# Patient Record
Sex: FEMALE | Race: WHITE | NOT HISPANIC OR LATINO | Employment: FULL TIME | URBAN - METROPOLITAN AREA
[De-identification: names, ages, dates, MRNs, and addresses within clinical notes are randomized per-mention and may not be internally consistent; named-entity substitution may affect disease eponyms.]

---

## 2018-01-17 NOTE — PROGRESS NOTES
Assessment    1  Acute sinus infection (461 9) (J01 90)    Discussion/Summary    #1  Sinusitis-patient given prescription for Zithromax 500 mg one daily #3, Mucinex D  one twice a day and Delsym 2 teaspoons twice a day for her cough  Followup if not better  Possible side effects of new medications were reviewed with the patient/guardian today  The treatment plan was reviewed with the patient/guardian  The patient/guardian understands and agrees with the treatment plan      Chief Complaint  cold x 1 wk - recently became worse  productive cough, green nasal discharge, HA, sinus pressure  OTC meds offer some relief, but not resolving  - [facility]      History of Present Illness  HPI: This is a 24-year-old female who comes in with symptoms of head congestion which started as a cold approximately a week ago  In the past several days she has been bringing up sputum and blowing green exudates at of her nose  Her throat is sore and kept her awake last night  She does admit to having postnasal drip  She has used DayQuil and NyQuil with no relief  Last night she took a Mucinex D  and did not sleep the whole night  She denies any nausea, vomiting or diarrhea  Her blood pressure is 104/76 and her temperature is 98 6  She is allergic to sulfa  Review of Systems    Constitutional: feeling poorly, but as noted in HPI, no fever, no chills and not feeling tired  ENT: Head congestion and postnasal drip, but as noted in HPI, no nosebleeds, no hearing loss and no hoarseness    The patient presents with complaints of gradual onset of mild left earache, described as dull and tender, non-radiating  The patient presents with complaints of gradual onset of moderate bilateral sore throat, described as sharp, non-radiating  The patient presents with complaints of gradual onset of frequent episodes of moderate bilateral nasal discharge     Cardiovascular: no complaints of slow or fast heart rate, no chest pain, no palpitations, no leg claudication or lower extremity edema  Respiratory: as noted in HPI, no shortness of breath, no orthopnea, no wheezing, no shortness of breath during exertion and no PND    The patient presents with complaints of gradual onset of intermittent episodes of moderate cough, described as loose and productive  Gastrointestinal: no complaints of abdominal pain, no constipation, no nausea or diarrhea, no vomiting, no bloody stools  Genitourinary: no complaints of dysuria, no incontinence, no pelvic pain, no dysmenorrhea, no vaginal discharge or abnormal vaginal bleeding  Active Problems    1  Allergic rhinitis (477 9) (J30 9)   2  Dyslipidemia (272 4) (E78 5)   3  Eustachian tube dysfunction (381 81) (H69 80)   4  Fever (780 60) (R50 9)   5  Flu vaccine need (V04 81) (Z23)   6  General medical examination (V70 9) (Z00 00)   7  Generalized anxiety disorder (300 02) (F41 1)   8  Myalgia (729 1) (M79 1)   9  Otitis media (382 9) (H66 90)   10  Request Consultation By Genetic Counselor (X68 33)    Past Medical History    1  History of acute sinusitis (V12 69) (Z87 09)   2  History of pregnancy (V13 29)    Family History    1  Family history of Coronary Artery Disease (V17 49)   2  Family history of Diabetes Mellitus (V18 0)    3  Family history of Thyroid Disorder (V18 19)    Social History    · Being A Social Drinker   · Denied: History of Drug Use   · Marital History - Currently    · Never A Smoker   · Occupation:    · [employer]    Surgical History    1  History of Dilation And Curettage   2  History of Oral Surgery Tooth Extraction   3  History of Oral Surgery Tooth Extraction   4  Request Consultation By Genetic Counselor (S97 33)    Current Meds   1  Budesonide 32 MCG/ACT Nasal Suspension; USE 1 SPRAY IN EACH NOSTRIL ONCE   DAILY; Therapy: 96ALO2825 to (Last GI:82XYY3000)  Requested for: 30EWG2889 Ordered   2   Escitalopram Oxalate 10 MG Oral Tablet; TAKE 1 TABLET BY MOUTH EVERY DAY; Therapy: 55WIH4526 to (Vance Wen)  Requested for: 92Ruy4225; Last   Rx:[id number] Ordered    Allergies    1  Bactrim TABS   2  Sulfa Drugs    Vitals   Recorded: 88MVR9355 10:37AM   Temperature 98 6 F, Oral   Heart Rate 88, L Radial   Pulse Quality Regular   Systolic 353, LUE, Sitting   Diastolic 76, LUE, Sitting   Height 5 ft 5 in   Weight 193 lb 2 08 oz   BMI Calculated 32 14   BSA Calculated 1 95     Physical Exam    Constitutional   General appearance: No acute distress, well appearing and well nourished  Ears, Nose, Mouth, and Throat   External inspection of ears and nose: Normal     Otoscopic examination: Abnormal   Left tympanic membrane is dull and erythematous  Oropharynx: Abnormal   Positive postnasal drip no ear edema of posterior pharynx and no exudates  Pulmonary   Auscultation of lungs: Clear to auscultation  Cardiovascular   Auscultation of heart: Normal rate and rhythm, normal S1 and S2, without murmurs  Examination of extremities for edema and/or varicosities: Normal     Lymphatic   Palpation of lymph nodes in neck: No lymphadenopathy  Results/Data  [place] 15ZEF0001 10:41AM User, Siris     Test Name Result Flag Reference   SBIRT Screen - Tobacco Screening Result Negative       PHQ-2 Adult Depression Screening 54WMN7967 10:41AM User, Ahs     Test Name Result Flag Reference   PHQ-2 Adult Depression Score 0     Q1: 0, Q2: 0   PHQ-2 Adult Depression Screening Negative         Signatures   Electronically signed by : Musa Coffman, [facility]; Arsenio 15 2016 10:55AM EST                       (Author)    Electronically signed by :  DIXON Valdez ; Arsenio 15 2016  2:15PM EST

## 2018-03-16 DIAGNOSIS — F41.9 ANXIETY: Primary | ICD-10-CM

## 2018-03-16 RX ORDER — ESCITALOPRAM OXALATE 10 MG/1
TABLET ORAL
Qty: 30 TABLET | Refills: 2 | Status: SHIPPED | OUTPATIENT
Start: 2018-03-16 | End: 2018-03-19 | Stop reason: SDUPTHER

## 2018-03-19 ENCOUNTER — OFFICE VISIT (OUTPATIENT)
Dept: FAMILY MEDICINE CLINIC | Facility: CLINIC | Age: 33
End: 2018-03-19
Payer: COMMERCIAL

## 2018-03-19 VITALS
HEIGHT: 65 IN | SYSTOLIC BLOOD PRESSURE: 120 MMHG | BODY MASS INDEX: 34.55 KG/M2 | DIASTOLIC BLOOD PRESSURE: 60 MMHG | WEIGHT: 207.4 LBS | HEART RATE: 72 BPM

## 2018-03-19 DIAGNOSIS — F41.9 ANXIETY: ICD-10-CM

## 2018-03-19 DIAGNOSIS — J30.1 ALLERGIC RHINITIS DUE TO POLLEN, UNSPECIFIED CHRONICITY, UNSPECIFIED SEASONALITY: ICD-10-CM

## 2018-03-19 DIAGNOSIS — E78.5 DYSLIPIDEMIA: Primary | ICD-10-CM

## 2018-03-19 PROCEDURE — 99214 OFFICE O/P EST MOD 30 MIN: CPT | Performed by: PHYSICIAN ASSISTANT

## 2018-03-19 PROCEDURE — 3008F BODY MASS INDEX DOCD: CPT | Performed by: PHYSICIAN ASSISTANT

## 2018-03-19 PROCEDURE — 1036F TOBACCO NON-USER: CPT | Performed by: PHYSICIAN ASSISTANT

## 2018-03-19 RX ORDER — ESCITALOPRAM OXALATE 10 MG/1
10 TABLET ORAL DAILY
Qty: 30 TABLET | Refills: 5 | Status: SHIPPED | OUTPATIENT
Start: 2018-03-19 | End: 2018-10-13 | Stop reason: SDUPTHER

## 2018-03-19 NOTE — PROGRESS NOTES
Assessment/Plan:  Patient Instructions   1  Anxiety-patient has been stable on Lexapro 10 mg for 6-7 years and will continue long-term therapy  She has previously weaned with worsening symptoms  2   Dyslipidemia-recommend assessing blood work panel  3   Family history of hypothyroidism-will assess TSH level  4   Allergic rhinitis-stable currently, no medication changes  No problem-specific Assessment & Plan notes found for this encounter  Diagnoses and all orders for this visit:    Dyslipidemia  -     CBC and differential  -     Comprehensive metabolic panel  -     TSH, 3rd generation with T4 reflex  -     Lipid Panel with Direct LDL reflex    Anxiety  -     escitalopram (LEXAPRO) 10 mg tablet; Take 1 tablet (10 mg total) by mouth daily for 30 days  -     CBC and differential  -     Comprehensive metabolic panel  -     TSH, 3rd generation with T4 reflex  -     Lipid Panel with Direct LDL reflex    Allergic rhinitis due to pollen, unspecified chronicity, unspecified seasonality  -     CBC and differential  -     Comprehensive metabolic panel  -     TSH, 3rd generation with T4 reflex  -     Lipid Panel with Direct LDL reflex          Subjective:      Patient ID: Juan Daniel Suero is a 27 y o  female  Chief complaint:  Pt is here for anxiety follow up and med renewal ~cd    HPI:  This is a 64-year-old female who presents to the office for follow-up of anxiety  She continues to use Lexapro 10 mg daily and has been stable on the medication for the past 6-7 years  There was a period of time where she tried to wean off the medication during pregnancy but did notice that things were worse without it  She is happy to continue it long-term at this point and feels that it is still working as well as it was several years ago  She is not interested in increasing the dose  She denies any side effects of the medication  She also mentions that there is significant family history of hypothyroidism    There is no known heart disease but she does have a personal history of dyslipidemia  It has been quite sometime since she has had routine blood test completed  The following portions of the patient's history were reviewed and updated as appropriate: allergies, current medications, past family history, past medical history, past social history, past surgical history and problem list     Review of Systems   Constitutional: Negative for chills, fatigue and fever  HENT: Negative for congestion, ear pain and sinus pressure  Eyes: Negative for visual disturbance  Respiratory: Negative for cough, chest tightness and shortness of breath  Cardiovascular: Negative for chest pain and palpitations  Gastrointestinal: Negative for diarrhea, nausea and vomiting  Endocrine: Negative for polyuria  Genitourinary: Negative for dysuria and frequency  Musculoskeletal: Negative for arthralgias and myalgias  Skin: Negative for pallor and rash  Neurological: Negative for dizziness, weakness, light-headedness, numbness and headaches  Psychiatric/Behavioral: Negative for agitation, behavioral problems and sleep disturbance  All other systems reviewed and are negative  Objective:  Vitals:    03/19/18 1629   BP: 120/60   BP Location: Left arm   Patient Position: Sitting   Cuff Size: Large   Pulse: 72   Weight: 94 1 kg (207 lb 6 4 oz)   Height: 5' 5" (1 651 m)     /60 (BP Location: Left arm, Patient Position: Sitting, Cuff Size: Large)   Pulse 72   Ht 5' 5" (1 651 m)   Wt 94 1 kg (207 lb 6 4 oz)   LMP  (LMP Unknown)   BMI 34 51 kg/m²          Physical Exam   Constitutional: She is oriented to person, place, and time  She appears well-developed and well-nourished  No distress  HENT:   Head: Normocephalic and atraumatic  Right Ear: External ear normal    Left Ear: External ear normal    Nose: Nose normal    Mouth/Throat: Oropharynx is clear and moist  No oropharyngeal exudate     Eyes: Conjunctivae and EOM are normal  Pupils are equal, round, and reactive to light  Neck: Normal range of motion  Neck supple  No tracheal deviation present  No thyromegaly present  Cardiovascular: Normal rate, regular rhythm and normal heart sounds  Exam reveals no friction rub  No murmur heard  Pulmonary/Chest: Effort normal and breath sounds normal  No respiratory distress  She has no wheezes  She has no rales  Abdominal: Soft  Bowel sounds are normal  She exhibits no distension  There is no tenderness  There is no rebound and no guarding  Musculoskeletal: Normal range of motion  She exhibits no edema or tenderness  Lymphadenopathy:     She has no cervical adenopathy  Neurological: She is alert and oriented to person, place, and time  No cranial nerve deficit  Coordination normal    Skin: Skin is warm and dry  No rash noted  No erythema  Psychiatric: She has a normal mood and affect  Her behavior is normal  Thought content normal    Nursing note and vitals reviewed

## 2018-03-19 NOTE — PATIENT INSTRUCTIONS
1   Anxiety-patient has been stable on Lexapro 10 mg for 6-7 years and will continue long-term therapy  She has previously weaned with worsening symptoms  2   Dyslipidemia-recommend assessing blood work panel  3   Family history of hypothyroidism-will assess TSH level  4   Allergic rhinitis-stable currently, no medication changes

## 2018-10-13 DIAGNOSIS — F41.9 ANXIETY: ICD-10-CM

## 2018-10-15 RX ORDER — ESCITALOPRAM OXALATE 10 MG/1
TABLET ORAL
Qty: 30 TABLET | Refills: 5 | Status: SHIPPED | OUTPATIENT
Start: 2018-10-15 | End: 2019-04-07 | Stop reason: SDUPTHER

## 2019-04-07 DIAGNOSIS — F41.9 ANXIETY: ICD-10-CM

## 2019-04-08 RX ORDER — ESCITALOPRAM OXALATE 10 MG/1
10 TABLET ORAL DAILY
Qty: 30 TABLET | Refills: 0 | Status: SHIPPED | OUTPATIENT
Start: 2019-04-08 | End: 2020-04-21 | Stop reason: SDUPTHER

## 2019-04-13 DIAGNOSIS — F41.9 ANXIETY: ICD-10-CM

## 2019-04-15 RX ORDER — ESCITALOPRAM OXALATE 10 MG/1
TABLET ORAL
Qty: 30 TABLET | Refills: 5 | Status: SHIPPED | OUTPATIENT
Start: 2019-04-15 | End: 2019-11-08 | Stop reason: SDUPTHER

## 2019-11-08 DIAGNOSIS — F41.9 ANXIETY: ICD-10-CM

## 2019-11-08 RX ORDER — ESCITALOPRAM OXALATE 10 MG/1
TABLET ORAL
Qty: 30 TABLET | Refills: 5 | Status: SHIPPED | OUTPATIENT
Start: 2019-11-08 | End: 2020-12-16 | Stop reason: SDUPTHER

## 2020-04-21 ENCOUNTER — TELEMEDICINE (OUTPATIENT)
Dept: FAMILY MEDICINE CLINIC | Facility: CLINIC | Age: 35
End: 2020-04-21
Payer: COMMERCIAL

## 2020-04-21 DIAGNOSIS — F41.9 ANXIETY: ICD-10-CM

## 2020-04-21 DIAGNOSIS — Z83.49 FH: HYPOTHYROIDISM: Primary | ICD-10-CM

## 2020-04-21 PROCEDURE — 99213 OFFICE O/P EST LOW 20 MIN: CPT | Performed by: PHYSICIAN ASSISTANT

## 2020-04-21 RX ORDER — ESCITALOPRAM OXALATE 10 MG/1
10 TABLET ORAL DAILY
Qty: 30 TABLET | Refills: 11 | Status: SHIPPED | OUTPATIENT
Start: 2020-04-21 | End: 2020-12-16 | Stop reason: SDUPTHER

## 2020-12-03 LAB
ALBUMIN SERPL-MCNC: 4.4 G/DL (ref 3.8–4.8)
ALBUMIN/GLOB SERPL: 2 {RATIO} (ref 1.2–2.2)
ALP SERPL-CCNC: 81 IU/L (ref 39–117)
ALT SERPL-CCNC: 15 IU/L (ref 0–32)
AST SERPL-CCNC: 15 IU/L (ref 0–40)
BASOPHILS # BLD AUTO: 0 X10E3/UL (ref 0–0.2)
BASOPHILS NFR BLD AUTO: 0 %
BILIRUB SERPL-MCNC: 0.5 MG/DL (ref 0–1.2)
BUN SERPL-MCNC: 12 MG/DL (ref 6–20)
BUN/CREAT SERPL: 15 (ref 9–23)
CALCIUM SERPL-MCNC: 9 MG/DL (ref 8.7–10.2)
CHLORIDE SERPL-SCNC: 104 MMOL/L (ref 96–106)
CHOLEST SERPL-MCNC: 173 MG/DL (ref 100–199)
CO2 SERPL-SCNC: 23 MMOL/L (ref 20–29)
CREAT SERPL-MCNC: 0.79 MG/DL (ref 0.57–1)
EOSINOPHIL # BLD AUTO: 0 X10E3/UL (ref 0–0.4)
EOSINOPHIL NFR BLD AUTO: 1 %
ERYTHROCYTE [DISTWIDTH] IN BLOOD BY AUTOMATED COUNT: 11.8 % (ref 11.7–15.4)
GLOBULIN SER-MCNC: 2.2 G/DL (ref 1.5–4.5)
GLUCOSE SERPL-MCNC: 110 MG/DL (ref 65–99)
HCT VFR BLD AUTO: 39.7 % (ref 34–46.6)
HDLC SERPL-MCNC: 31 MG/DL
HGB BLD-MCNC: 13.1 G/DL (ref 11.1–15.9)
IMM GRANULOCYTES # BLD: 0 X10E3/UL (ref 0–0.1)
IMM GRANULOCYTES NFR BLD: 0 %
LDLC SERPL CALC-MCNC: 129 MG/DL (ref 0–99)
LYMPHOCYTES # BLD AUTO: 1 X10E3/UL (ref 0.7–3.1)
LYMPHOCYTES NFR BLD AUTO: 17 %
MCH RBC QN AUTO: 30.5 PG (ref 26.6–33)
MCHC RBC AUTO-ENTMCNC: 33 G/DL (ref 31.5–35.7)
MCV RBC AUTO: 92 FL (ref 79–97)
MONOCYTES # BLD AUTO: 0.4 X10E3/UL (ref 0.1–0.9)
MONOCYTES NFR BLD AUTO: 6 %
NEUTROPHILS # BLD AUTO: 4.6 X10E3/UL (ref 1.4–7)
NEUTROPHILS NFR BLD AUTO: 76 %
PLATELET # BLD AUTO: 214 X10E3/UL (ref 150–450)
POTASSIUM SERPL-SCNC: 4.1 MMOL/L (ref 3.5–5.2)
PROT SERPL-MCNC: 6.6 G/DL (ref 6–8.5)
RBC # BLD AUTO: 4.3 X10E6/UL (ref 3.77–5.28)
SL AMB EGFR AFRICAN AMERICAN: 111 ML/MIN/1.73
SL AMB EGFR NON AFRICAN AMERICAN: 96 ML/MIN/1.73
SODIUM SERPL-SCNC: 140 MMOL/L (ref 134–144)
TRIGL SERPL-MCNC: 69 MG/DL (ref 0–149)
TSH SERPL DL<=0.005 MIU/L-ACNC: 2.22 UIU/ML (ref 0.45–4.5)
WBC # BLD AUTO: 6 X10E3/UL (ref 3.4–10.8)

## 2020-12-04 ENCOUNTER — TELEPHONE (OUTPATIENT)
Dept: FAMILY MEDICINE CLINIC | Facility: CLINIC | Age: 35
End: 2020-12-04

## 2020-12-04 DIAGNOSIS — E78.5 DYSLIPIDEMIA: Primary | ICD-10-CM

## 2020-12-04 DIAGNOSIS — F32.A DEPRESSION, UNSPECIFIED DEPRESSION TYPE: ICD-10-CM

## 2020-12-04 DIAGNOSIS — F41.1 GENERALIZED ANXIETY DISORDER: ICD-10-CM

## 2020-12-14 LAB
ESTROGEN SERPL-MCNC: 351 PG/ML
FSH SERPL-ACNC: 5.6 MIU/ML
LH SERPL-ACNC: 19 MIU/ML
PROGEST SERPL-MCNC: 1.1 NG/ML
T3 SERPL-MCNC: 102 NG/DL (ref 71–180)
T4 FREE SERPL-MCNC: 1.16 NG/DL (ref 0.82–1.77)

## 2020-12-16 ENCOUNTER — OFFICE VISIT (OUTPATIENT)
Dept: FAMILY MEDICINE CLINIC | Facility: CLINIC | Age: 35
End: 2020-12-16
Payer: COMMERCIAL

## 2020-12-16 ENCOUNTER — TELEPHONE (OUTPATIENT)
Dept: ADMINISTRATIVE | Facility: OTHER | Age: 35
End: 2020-12-16

## 2020-12-16 VITALS
BODY MASS INDEX: 35.51 KG/M2 | WEIGHT: 208 LBS | SYSTOLIC BLOOD PRESSURE: 110 MMHG | DIASTOLIC BLOOD PRESSURE: 64 MMHG | TEMPERATURE: 98.2 F | HEART RATE: 76 BPM | HEIGHT: 64 IN

## 2020-12-16 DIAGNOSIS — F41.9 ANXIETY: ICD-10-CM

## 2020-12-16 DIAGNOSIS — F32.1 MODERATE MAJOR DEPRESSION (HCC): Primary | ICD-10-CM

## 2020-12-16 DIAGNOSIS — R73.01 ELEVATED FASTING GLUCOSE: ICD-10-CM

## 2020-12-16 DIAGNOSIS — E78.5 DYSLIPIDEMIA: ICD-10-CM

## 2020-12-16 PROCEDURE — 3008F BODY MASS INDEX DOCD: CPT | Performed by: PHYSICIAN ASSISTANT

## 2020-12-16 PROCEDURE — 3725F SCREEN DEPRESSION PERFORMED: CPT | Performed by: PHYSICIAN ASSISTANT

## 2020-12-16 PROCEDURE — 1036F TOBACCO NON-USER: CPT | Performed by: PHYSICIAN ASSISTANT

## 2020-12-16 PROCEDURE — 99214 OFFICE O/P EST MOD 30 MIN: CPT | Performed by: PHYSICIAN ASSISTANT

## 2020-12-16 RX ORDER — ESCITALOPRAM OXALATE 20 MG/1
20 TABLET ORAL DAILY
Qty: 30 TABLET | Refills: 5 | Status: SHIPPED | OUTPATIENT
Start: 2020-12-16

## 2020-12-16 RX ORDER — DIPHENOXYLATE HYDROCHLORIDE AND ATROPINE SULFATE 2.5; .025 MG/1; MG/1
1 TABLET ORAL DAILY
COMMUNITY

## 2021-01-25 ENCOUNTER — TELEPHONE (OUTPATIENT)
Dept: FAMILY MEDICINE CLINIC | Facility: CLINIC | Age: 36
End: 2021-01-25

## 2021-01-25 DIAGNOSIS — F41.1 GENERALIZED ANXIETY DISORDER: Primary | ICD-10-CM

## 2021-01-25 RX ORDER — ALPRAZOLAM 0.5 MG/1
0.5 TABLET ORAL
Qty: 20 TABLET | Refills: 0 | Status: SHIPPED | OUTPATIENT
Start: 2021-01-25 | End: 2021-07-22 | Stop reason: ALTCHOICE

## 2021-01-25 NOTE — TELEPHONE ENCOUNTER
I will prescribe a short-term prescription for Xanax in the evening to be used as needed    This should not be long-term as a does have addictive potential

## 2021-01-25 NOTE — TELEPHONE ENCOUNTER
Regarding: Non-Urgent Medical Question  Contact: [phone]  ----- Message from Diogenes Servin sent at 1/25/2021  7:59 AM EST -----       ----- Message from Isidra Neely to Elvia Carmona PA-C sent at 1/24/2021  8:39 PM -----   Hi Dr Deejay Kurtz - It's been about 3 weeks since I upped my Lexapro dosage from 10 mg to 20 mg, and I've been having terrible insomnia - waking up throughout the night with waves of dread and anxiety  Would you be able to prescribe something to help me sleep, like Xanax or something else? Let me know if I should schedule an appt   Thanks so much! - Matheus Toth

## 2021-01-27 ENCOUNTER — TELEPHONE (OUTPATIENT)
Dept: FAMILY MEDICINE CLINIC | Facility: CLINIC | Age: 36
End: 2021-01-27

## 2021-01-27 ENCOUNTER — TELEMEDICINE (OUTPATIENT)
Dept: FAMILY MEDICINE CLINIC | Facility: CLINIC | Age: 36
End: 2021-01-27
Payer: COMMERCIAL

## 2021-01-27 VITALS — WEIGHT: 208 LBS | HEIGHT: 64 IN | BODY MASS INDEX: 35.51 KG/M2

## 2021-01-27 DIAGNOSIS — R68.83 CHILLS: ICD-10-CM

## 2021-01-27 DIAGNOSIS — R09.81 NASAL CONGESTION: ICD-10-CM

## 2021-01-27 DIAGNOSIS — A08.4 VIRAL GASTROENTERITIS: ICD-10-CM

## 2021-01-27 DIAGNOSIS — R11.2 NAUSEA AND VOMITING, INTRACTABILITY OF VOMITING NOT SPECIFIED, UNSPECIFIED VOMITING TYPE: Primary | ICD-10-CM

## 2021-01-27 DIAGNOSIS — R53.83 FATIGUE, UNSPECIFIED TYPE: ICD-10-CM

## 2021-01-27 PROCEDURE — 3008F BODY MASS INDEX DOCD: CPT | Performed by: PHYSICIAN ASSISTANT

## 2021-01-27 PROCEDURE — 99214 OFFICE O/P EST MOD 30 MIN: CPT | Performed by: FAMILY MEDICINE

## 2021-01-27 PROCEDURE — U0005 INFEC AGEN DETEC AMPLI PROBE: HCPCS | Performed by: FAMILY MEDICINE

## 2021-01-27 PROCEDURE — U0003 INFECTIOUS AGENT DETECTION BY NUCLEIC ACID (DNA OR RNA); SEVERE ACUTE RESPIRATORY SYNDROME CORONAVIRUS 2 (SARS-COV-2) (CORONAVIRUS DISEASE [COVID-19]), AMPLIFIED PROBE TECHNIQUE, MAKING USE OF HIGH THROUGHPUT TECHNOLOGIES AS DESCRIBED BY CMS-2020-01-R: HCPCS | Performed by: FAMILY MEDICINE

## 2021-01-27 RX ORDER — PROMETHAZINE HYDROCHLORIDE 25 MG/1
25 TABLET ORAL EVERY 6 HOURS PRN
Qty: 30 TABLET | Refills: 0 | Status: SHIPPED | OUTPATIENT
Start: 2021-01-27 | End: 2021-07-22 | Stop reason: ALTCHOICE

## 2021-01-27 NOTE — TELEPHONE ENCOUNTER
PATIENT JUST CALLED HAD VIRTUAL VISIT THIS MORNING WITH DR JEAN BAPTISTE STATED SHE WENT TO [facility] [address] TO PICKUP MEDICATION & THEY DO NOT HAVE IT, PT STATES IT IS THE PHENERGAN   PLEASE ADVISE       DO NOT SEE MEDICATION BEING SENT

## 2021-01-27 NOTE — PATIENT INSTRUCTIONS
Complete COVID test remain quarantine at 100 rules known  24 hours use clear liquid diet, 1/2 strength Gatorade would be most appropriate  Advance as tolerated with diet  Call if no improvement 24-48 hours and not fully resolved in 1 week    If worsen call office

## 2021-01-27 NOTE — PROGRESS NOTES
COVID-19 Virtual Visit     Assessment/Plan:  1  Nausea vomiting  2  Chills  3  Fatigue  4  Nasal congestion  5  Viral gastroenteritis  Rule out COVID, patient is in for COVID test   Patient may quarantine at home result is known  Clear liquid diet start with 1/2 strength Gatorade advance as tolerated  Call in 24-48 hours if no improvement in 1 week if still symptoms  If worsen call office immediately  Phenergan ordered  Drowsiness discussed      Problem List Items Addressed This Visit     None      Visit Diagnoses     Nausea and vomiting, intractability of vomiting not specified, unspecified vomiting type    -  Primary    Chills        Relevant Orders    Novel Coronavirus (Covid-19),PCR [facility] - Collected at Mobile [facility] or [facility]    Fatigue, unspecified type        Nasal congestion        Viral gastroenteritis             Disposition:     I referred patient to one of our centralized sites for a COVID-19 swab  Patient is in for COVID testing  Remain quarantine home to result is known  Patient use clear liquid diet for 24 hours advance as tolerated  Phenergan was ordered drowsiness discussed  I have spent 18 minutes directly with the patient  Encounter provider Melany Jeans, DO    Provider located at [address] [place] [id number]    Recent Visits  Date Type Provider Dept   01/25/21 Telephone Sapna Marshall, [address] [facility]   Showing recent visits within past 7 days and meeting all other requirements     Today's Visits  Date Type Provider Dept   01/27/21 Telemedicine Melany Jeans, DO [facility]   Showing today's visits and meeting all other requirements     Future Appointments  No visits were found meeting these conditions     Showing future appointments within next 150 days and meeting all other requirements      This virtual check-in was done via [employer] and patient was informed that this is not a secure, HIPAA-compliant platform  She agrees to proceed  Patient agrees to participate in a virtual check in via telephone or video visit instead of presenting to the office to address urgent/immediate medical needs  Patient is aware this is a billable service  After connecting through [facility], the patient was identified by name and date of birth  Tad Mojica was informed that this was a telemedicine visit and that the exam was being conducted confidentially over secure lines  My office door was closed  No one else was in the room  Tad Mojica acknowledged consent and understanding of privacy and security of the telemedicine visit  I informed the patient that I have reviewed her record in Epic and presented the opportunity for her to ask any questions regarding the visit today  The patient agreed to participate  Subjective: Tad Mojica is a 38 y o  female who is concerned about COVID-19  Patient's symptoms include chills, fatigue, nasal congestion, rhinorrhea, nausea and vomiting  Patient denies fever, malaise, sore throat, anosmia, loss of taste, cough, shortness of breath, chest tightness, abdominal pain, diarrhea, myalgias and headaches       Date of symptom onset: 1/24/2021    Exposure:   Contact with a person who is under investigation (PUI) for or who is positive for COVID-19 within the last 14 days?: No    Hospitalized recently for fever and/or lower respiratory symptoms?: No      Currently a healthcare worker that is involved in direct patient care?: No      Works in a special setting where the risk of COVID-19 transmission may be high? (this may include long-term care, correctional and care home facilities; homeless shelters; assisted-living facilities and group homes ): No      Resident in a special setting where the risk of COVID-19 transmission may be high? (this may include long-term care, correctional and care home facilities; homeless shelters; assisted-living facilities and group homes ): No      Sunday, 01/24/2021, patient started with nausea vomiting mild head congestion does have chills denies any fever  Positive fatigue  No abdominal pain  No results found for: [address] [facility] 98, [address] [facility], [address] [facility], [facility] 116  History reviewed  No pertinent past medical history  Past Surgical History:   Procedure Laterality Date    DILATION AND CURETTAGE OF UTERUS      post miscarriage    TOOTH EXTRACTION       Current Outpatient Medications   Medication Sig Dispense Refill    ALPRAZolam (XANAX) 0 5 mg tablet Take 1 tablet (0 5 mg total) by mouth daily at bedtime as needed for anxiety 20 tablet 0    escitalopram (LEXAPRO) 20 mg tablet Take 1 tablet (20 mg total) by mouth daily 30 tablet 5    multivitamin (THERAGRAN) TABS Take 1 tablet by mouth daily       No current facility-administered medications for this visit  Allergies   Allergen Reactions    Sulfa Antibiotics Nausea Only, Vomiting and GI Intolerance       Review of Systems   Constitutional: Positive for chills and fatigue  Negative for fever  HENT: Positive for congestion and rhinorrhea  Negative for sore throat  Eyes: Negative  Respiratory: Negative for cough, chest tightness and shortness of breath  Gastrointestinal: Positive for nausea and vomiting  Negative for abdominal pain and diarrhea  Endocrine: Negative  Genitourinary: Negative  Musculoskeletal: Negative for myalgias  Skin: Negative  Allergic/Immunologic: Negative  Neurological: Negative for headaches  Hematological: Negative  Psychiatric/Behavioral: Negative  Objective:    Vitals:    01/27/21 1100   Weight: 94 3 kg (208 lb)   Height: 5' 4" (1 626 m)       Physical Exam  Vitals signs and nursing note reviewed  Constitutional:       General: She is not in acute distress  Appearance: Normal appearance  She is not ill-appearing, toxic-appearing or diaphoretic  HENT:      Head: Normocephalic and atraumatic  Mouth/Throat:      Mouth: Mucous membranes are moist    Eyes:      General: No scleral icterus  Right eye: No discharge  Left eye: No discharge  Conjunctiva/sclera: Conjunctivae normal    Neck:      Musculoskeletal: No neck rigidity  Pulmonary:      Effort: Pulmonary effort is normal    Skin:     General: Skin is dry  Neurological:      Mental Status: She is alert and oriented to person, place, and time  Cranial Nerves: No cranial nerve deficit  Psychiatric:         Mood and Affect: Mood normal          Behavior: Behavior normal          Thought Content: Thought content normal          Judgment: Judgment normal        VIRTUAL VISIT DISCLAIMER    Gaston Henry acknowledges that she has consented to an online visit or consultation  She understands that the online visit is based solely on information provided by her, and that, in the absence of a face-to-face physical evaluation by the physician, the diagnosis she receives is both limited and provisional in terms of accuracy and completeness  This is not intended to replace a full medical face-to-face evaluation by the physician  Gaston Henry understands and accepts these terms

## 2021-01-28 LAB — SARS-COV-2 RNA RESP QL NAA+PROBE: NEGATIVE

## 2021-05-14 ENCOUNTER — PATIENT MESSAGE (OUTPATIENT)
Dept: FAMILY MEDICINE CLINIC | Facility: CLINIC | Age: 36
End: 2021-05-14

## 2021-05-18 ENCOUNTER — TELEPHONE (OUTPATIENT)
Dept: FAMILY MEDICINE CLINIC | Facility: CLINIC | Age: 36
End: 2021-05-18

## 2021-05-18 ENCOUNTER — PATIENT MESSAGE (OUTPATIENT)
Dept: FAMILY MEDICINE CLINIC | Facility: CLINIC | Age: 36
End: 2021-05-18

## 2021-05-18 NOTE — TELEPHONE ENCOUNTER
Yes, please notify patient that I do have active orders in the computer for her that were placed on the 16th of December and should be completed before her visit

## 2021-05-18 NOTE — TELEPHONE ENCOUNTER
----- Message from Steffany Demetrio sent at 5/14/2021  3:45 PM EDT -----  Regarding: FW: Non-Urgent Medical Question  Contact: [phone]    ----- Message -----  From: Cassie Flanagan  Sent: 5/14/2021   2:53 PM EDT  To: [facility]  Subject: Non-Urgent Medical Question                      Hi Dr Drake Can - I have a follow-up visit scheduled with you for June 16, and I think we talked about repeating all the bloodwork I did in Dec/Jan  Should I get that bloodwork done BEFORE the appt? Thanks!

## 2021-06-09 LAB
ALBUMIN SERPL-MCNC: 4.1 G/DL (ref 3.8–4.8)
ALBUMIN/GLOB SERPL: 1.9 {RATIO} (ref 1.2–2.2)
ALP SERPL-CCNC: 77 IU/L (ref 48–121)
ALT SERPL-CCNC: 13 IU/L (ref 0–32)
AST SERPL-CCNC: 18 IU/L (ref 0–40)
BASOPHILS # BLD AUTO: 0 X10E3/UL (ref 0–0.2)
BASOPHILS NFR BLD AUTO: 0 %
BILIRUB SERPL-MCNC: 0.3 MG/DL (ref 0–1.2)
BUN SERPL-MCNC: 14 MG/DL (ref 6–20)
BUN/CREAT SERPL: 17 (ref 9–23)
CALCIUM SERPL-MCNC: 8.9 MG/DL (ref 8.7–10.2)
CHLORIDE SERPL-SCNC: 101 MMOL/L (ref 96–106)
CHOLEST SERPL-MCNC: 183 MG/DL (ref 100–199)
CO2 SERPL-SCNC: 25 MMOL/L (ref 20–29)
CREAT SERPL-MCNC: 0.81 MG/DL (ref 0.57–1)
EOSINOPHIL # BLD AUTO: 0 X10E3/UL (ref 0–0.4)
EOSINOPHIL NFR BLD AUTO: 1 %
ERYTHROCYTE [DISTWIDTH] IN BLOOD BY AUTOMATED COUNT: 12.2 % (ref 11.7–15.4)
EST. AVERAGE GLUCOSE BLD GHB EST-MCNC: 111 MG/DL
GLOBULIN SER-MCNC: 2.2 G/DL (ref 1.5–4.5)
GLUCOSE SERPL-MCNC: 90 MG/DL (ref 65–99)
HBA1C MFR BLD: 5.5 % (ref 4.8–5.6)
HCT VFR BLD AUTO: 38.3 % (ref 34–46.6)
HDLC SERPL-MCNC: 32 MG/DL
HGB BLD-MCNC: 13.1 G/DL (ref 11.1–15.9)
IMM GRANULOCYTES # BLD: 0 X10E3/UL (ref 0–0.1)
IMM GRANULOCYTES NFR BLD: 0 %
LDLC SERPL CALC-MCNC: 136 MG/DL (ref 0–99)
LDLC/HDLC SERPL: 4.3 RATIO (ref 0–3.2)
LYMPHOCYTES # BLD AUTO: 1.4 X10E3/UL (ref 0.7–3.1)
LYMPHOCYTES NFR BLD AUTO: 29 %
MCH RBC QN AUTO: 30.5 PG (ref 26.6–33)
MCHC RBC AUTO-ENTMCNC: 34.2 G/DL (ref 31.5–35.7)
MCV RBC AUTO: 89 FL (ref 79–97)
MONOCYTES # BLD AUTO: 0.4 X10E3/UL (ref 0.1–0.9)
MONOCYTES NFR BLD AUTO: 9 %
NEUTROPHILS # BLD AUTO: 3 X10E3/UL (ref 1.4–7)
NEUTROPHILS NFR BLD AUTO: 61 %
PLATELET # BLD AUTO: 207 X10E3/UL (ref 150–450)
POTASSIUM SERPL-SCNC: 4.4 MMOL/L (ref 3.5–5.2)
PROT SERPL-MCNC: 6.3 G/DL (ref 6–8.5)
RBC # BLD AUTO: 4.29 X10E6/UL (ref 3.77–5.28)
SL AMB EGFR AFRICAN AMERICAN: 107 ML/MIN/1.73
SL AMB EGFR NON AFRICAN AMERICAN: 92 ML/MIN/1.73
SL AMB VLDL CHOLESTEROL CALC: 15 MG/DL (ref 5–40)
SODIUM SERPL-SCNC: 137 MMOL/L (ref 134–144)
TRIGL SERPL-MCNC: 79 MG/DL (ref 0–149)
TSH SERPL DL<=0.005 MIU/L-ACNC: 1.78 UIU/ML (ref 0.45–4.5)
WBC # BLD AUTO: 4.9 X10E3/UL (ref 3.4–10.8)

## 2021-07-22 ENCOUNTER — OFFICE VISIT (OUTPATIENT)
Dept: FAMILY MEDICINE CLINIC | Facility: CLINIC | Age: 36
End: 2021-07-22
Payer: COMMERCIAL

## 2021-07-22 VITALS
HEART RATE: 92 BPM | HEIGHT: 64 IN | WEIGHT: 201 LBS | BODY MASS INDEX: 34.31 KG/M2 | TEMPERATURE: 98.1 F | SYSTOLIC BLOOD PRESSURE: 118 MMHG | DIASTOLIC BLOOD PRESSURE: 72 MMHG

## 2021-07-22 DIAGNOSIS — R73.01 ELEVATED FASTING GLUCOSE: ICD-10-CM

## 2021-07-22 DIAGNOSIS — R00.2 PALPITATIONS: ICD-10-CM

## 2021-07-22 DIAGNOSIS — E78.5 DYSLIPIDEMIA: ICD-10-CM

## 2021-07-22 DIAGNOSIS — F41.1 GENERALIZED ANXIETY DISORDER: Primary | ICD-10-CM

## 2021-07-22 PROCEDURE — 3008F BODY MASS INDEX DOCD: CPT | Performed by: PHYSICIAN ASSISTANT

## 2021-07-22 PROCEDURE — 1036F TOBACCO NON-USER: CPT | Performed by: PHYSICIAN ASSISTANT

## 2021-07-22 PROCEDURE — 99214 OFFICE O/P EST MOD 30 MIN: CPT | Performed by: PHYSICIAN ASSISTANT

## 2021-07-22 RX ORDER — MELATONIN 10 MG
TABLET, SUBLINGUAL SUBLINGUAL
COMMUNITY
Start: 2021-01-01

## 2021-07-22 RX ORDER — ACETAMINOPHEN AND CODEINE PHOSPHATE 120; 12 MG/5ML; MG/5ML
0.35 SOLUTION ORAL DAILY
COMMUNITY
Start: 2021-03-09 | End: 2022-03-09

## 2021-07-22 RX ORDER — CYANOCOBALAMIN/FOLIC ACID 1MG-400MCG
TABLET, SUBLINGUAL SUBLINGUAL
COMMUNITY

## 2021-07-22 NOTE — PATIENT INSTRUCTIONS
Assessment/plan:  1  Anxiety with depression -patient is having some exacerbation of symptoms  She does continue to follow with the [facility] for medication management  She is questioning if there is some vitamin deficiency or hormonal imbalance that may be contributing to her symptoms and we will order a full panel of labs  Follow up as needed  2  Dyslipidemia -discussed continued diet and exercise for this  She may also add fish oil over-the-counter  She does not have fish regularly in the diet  3  Elevated fasting glucose-this has improved  It has come down from 110 to 90  Her hemoglobin A1c is 5 5  Continue present diet and exercise

## 2021-07-22 NOTE — PROGRESS NOTES
Assessment and Plan:  Patient Instructions     Assessment/plan:  1  Anxiety with depression -patient is having some exacerbation of symptoms  She does continue to follow with the [facility] for medication management  She is questioning if there is some vitamin deficiency or hormonal imbalance that may be contributing to her symptoms and we will order a full panel of labs  Follow up as needed  2  Dyslipidemia -discussed continued diet and exercise for this  She may also add fish oil over-the-counter  She does not have fish regularly in the diet  3  Elevated fasting glucose-this has improved  It has come down from 110 to 90  Her hemoglobin A1c is 5 5  Continue present diet and exercise        Problem List Items Addressed This Visit        Other    Dyslipidemia    Relevant Orders    Vitamin D 25 hydroxy    Magnesium    Basic metabolic panel    Folate    Celiac Disease Antibody Profile    Estrogens, total    Progesterone    Follicle stimulating hormone    FSH and LH    Luteinizing hormone    TSH, 3rd generation with Free T4 reflex    Generalized anxiety disorder - Primary    Relevant Orders    Vitamin D 25 hydroxy    Magnesium    Basic metabolic panel    Folate    Celiac Disease Antibody Profile    Estrogens, total    Progesterone    Follicle stimulating hormone    FSH and LH    Luteinizing hormone    TSH, 3rd generation with Free T4 reflex      Other Visit Diagnoses     Elevated fasting glucose        Relevant Orders    Vitamin D 25 hydroxy    Magnesium    Basic metabolic panel    Folate    Celiac Disease Antibody Profile    Estrogens, total    Progesterone    Follicle stimulating hormone    FSH and LH    Luteinizing hormone    TSH, 3rd generation with Free T4 reflex    Palpitations        Relevant Orders    Vitamin D 25 hydroxy    Magnesium    Basic metabolic panel    Folate    Celiac Disease Antibody Profile    Estrogens, total    Progesterone    Follicle stimulating hormone    FSH and LH Luteinizing hormone    TSH, 3rd generation with Free T4 reflex    Holter monitor - 24 hour                 Diagnoses and all orders for this visit:    Generalized anxiety disorder  -     Vitamin D 25 hydroxy  -     Magnesium; Future  -     Basic metabolic panel; Future  -     Folate; Future  -     Celiac Disease Antibody Profile; Future  -     Estrogens, total; Future  -     Progesterone; Future  -     Follicle stimulating hormone; Future  -     FSH and LH; Future  -     Luteinizing hormone; Future  -     TSH, 3rd generation with Free T4 reflex  -     Magnesium  -     Basic metabolic panel  -     Folate  -     Celiac Disease Antibody Profile  -     Estrogens, total  -     Progesterone  -     Follicle stimulating hormone  -     FSH and LH  -     Luteinizing hormone    Dyslipidemia  -     Vitamin D 25 hydroxy  -     Magnesium; Future  -     Basic metabolic panel; Future  -     Folate; Future  -     Celiac Disease Antibody Profile; Future  -     Estrogens, total; Future  -     Progesterone; Future  -     Follicle stimulating hormone; Future  -     FSH and LH; Future  -     Luteinizing hormone; Future  -     TSH, 3rd generation with Free T4 reflex  -     Magnesium  -     Basic metabolic panel  -     Folate  -     Celiac Disease Antibody Profile  -     Estrogens, total  -     Progesterone  -     Follicle stimulating hormone  -     FSH and LH  -     Luteinizing hormone    Elevated fasting glucose  -     Vitamin D 25 hydroxy  -     Magnesium; Future  -     Basic metabolic panel; Future  -     Folate; Future  -     Celiac Disease Antibody Profile; Future  -     Estrogens, total; Future  -     Progesterone; Future  -     Follicle stimulating hormone; Future  -     FSH and LH; Future  -     Luteinizing hormone;  Future  -     TSH, 3rd generation with Free T4 reflex  -     Magnesium  -     Basic metabolic panel  -     Folate  -     Celiac Disease Antibody Profile  -     Estrogens, total  -     Progesterone  -     Follicle stimulating hormone  -     FSH and LH  -     Luteinizing hormone    Palpitations  -     Vitamin D 25 hydroxy  -     Magnesium; Future  -     Basic metabolic panel; Future  -     Folate; Future  -     Celiac Disease Antibody Profile; Future  -     Estrogens, total; Future  -     Progesterone; Future  -     Follicle stimulating hormone; Future  -     FSH and LH; Future  -     Luteinizing hormone; Future  -     TSH, 3rd generation with Free T4 reflex  -     Holter monitor - 24 hour; Future  -     Magnesium  -     Basic metabolic panel  -     Folate  -     Celiac Disease Antibody Profile  -     Estrogens, total  -     Progesterone  -     Follicle stimulating hormone  -     FSH and LH  -     Luteinizing hormone    Other orders  -     Cancel: CBC and differential; Future  -     Cancel: Comprehensive metabolic panel; Future  -     Cancel: Hemoglobin A1C; Future  -     Cancel: Lipid Panel with Direct LDL reflex; Future  -     Cancel: TSH, 3rd generation; Future  -     Calcium Carb-Cholecalciferol (Calcium 500 + D3) 500-600 MG-UNIT TABS  -     Cobalamin Combinations (B-12) 1000-400 MCG SUBL  -     norethindrone (MICRONOR) 0 35 MG tablet; Take 0 35 mg by mouth daily              Subjective:      Patient ID: Haven Kong is a 43 y o  female  CC:    Chief Complaint   Patient presents with    Follow-up     Patient here for six months f/u  pt will like to discuss ongoing anxiety and dpression issues  HPI:      HPI:  This is a 41-year-old female who presents to the office for follow-up of anxiety and depression  She has been on Lexapro 20 mg   For the past 7 months  [facility]  She is questioning since she had some history of vitamin-D deficiency and stopped her supplementation if that may be related to worsening of her symptoms in the past 4-5 weeks    She does feel that she has had some increase in anxiety symptoms and occasionally fluttering in the chest   She has not had any chest pains or shortness of breath  She has not had any new stimulants  Symptoms may come several times per day  They are usually brief and short lived  She does continue to follow [facility] for her medication management but is wondering if there is any other abnormality of her blood work that may be attributed for her symptoms  She had hormone testing in December of last year but she states since then she has been having excessive hair growth and some other signs that they may be abnormal       The following portions of the patient's history were reviewed and updated as appropriate: allergies, current medications, past family history, past medical history, past social history, past surgical history and problem list       Review of Systems   Constitutional: Negative for chills and fever  HENT: Negative for ear pain and sore throat  Eyes: Negative for pain and visual disturbance  Respiratory: Negative for cough and shortness of breath  Cardiovascular: Negative for chest pain and palpitations  Gastrointestinal: Negative for abdominal pain and vomiting  Genitourinary: Negative for dysuria and hematuria  Musculoskeletal: Negative for arthralgias and back pain  Skin: Negative for color change and rash  Neurological: Negative for seizures and syncope  Psychiatric/Behavioral:        Anxiety and depression   All other systems reviewed and are negative  Data to review:       Objective:    Vitals:    07/22/21 0925   BP: 118/72   BP Location: Right arm   Patient Position: Sitting   Cuff Size: Adult   Pulse: 92   Temp: 98 1 °F (36 7 °C)   TempSrc: Temporal   Weight: 91 2 kg (201 lb)   Height: 5' 4" (1 626 m)        Physical Exam  Constitutional:       General: She is not in acute distress  Appearance: Normal appearance  HENT:      Head: Normocephalic and atraumatic        Right Ear: Tympanic membrane normal       Left Ear: Tympanic membrane normal  Nose: No congestion or rhinorrhea  Eyes:      Conjunctiva/sclera: Conjunctivae normal       Pupils: Pupils are equal, round, and reactive to light  Neck:      Vascular: No carotid bruit  Cardiovascular:      Rate and Rhythm: Normal rate and regular rhythm  Heart sounds: No murmur heard  Pulmonary:      Effort: Pulmonary effort is normal  No respiratory distress  Breath sounds: Normal breath sounds  Abdominal:      Palpations: Abdomen is soft  Musculoskeletal:         General: Normal range of motion  Cervical back: Normal range of motion and neck supple  No muscular tenderness  Lymphadenopathy:      Cervical: No cervical adenopathy  Skin:     General: Skin is warm  Capillary Refill: Capillary refill takes less than 2 seconds  Neurological:      General: No focal deficit present  Mental Status: She is alert and oriented to person, place, and time  Psychiatric:         Mood and Affect: Mood normal            BMI Counseling: Body mass index is 34 5 kg/m²  The BMI is above normal  Nutrition recommendations include decreasing portion sizes  Exercise recommendations include exercising 3-5 times per week

## 2021-07-29 LAB
25(OH)D3+25(OH)D2 SERPL-MCNC: 46.5 NG/ML (ref 30–100)
BUN SERPL-MCNC: 13 MG/DL (ref 6–20)
BUN/CREAT SERPL: 16 (ref 9–23)
CALCIUM SERPL-MCNC: 9.1 MG/DL (ref 8.7–10.2)
CHLORIDE SERPL-SCNC: 104 MMOL/L (ref 96–106)
CO2 SERPL-SCNC: 24 MMOL/L (ref 20–29)
CREAT SERPL-MCNC: 0.83 MG/DL (ref 0.57–1)
ENDOMYSIUM IGA SER QL: NEGATIVE
ESTROGEN SERPL-MCNC: 144 PG/ML
FOLATE SERPL-MCNC: >20 NG/ML
FSH SERPL-ACNC: 5.2 MIU/ML
GLIADIN PEPTIDE IGA SER-ACNC: 5 UNITS (ref 0–19)
GLIADIN PEPTIDE IGG SER-ACNC: 2 UNITS (ref 0–19)
GLUCOSE SERPL-MCNC: 95 MG/DL (ref 65–99)
IGA SERPL-MCNC: 132 MG/DL (ref 87–352)
LH SERPL-ACNC: 5.9 MIU/ML
MAGNESIUM SERPL-MCNC: 2.1 MG/DL (ref 1.6–2.3)
POTASSIUM SERPL-SCNC: 4.7 MMOL/L (ref 3.5–5.2)
PROGEST SERPL-MCNC: 0.1 NG/ML
SL AMB EGFR AFRICAN AMERICAN: 103 ML/MIN/1.73
SL AMB EGFR NON AFRICAN AMERICAN: 90 ML/MIN/1.73
SODIUM SERPL-SCNC: 140 MMOL/L (ref 134–144)
TSH SERPL DL<=0.005 MIU/L-ACNC: 1.3 UIU/ML (ref 0.45–4.5)
TTG IGA SER-ACNC: <2 U/ML (ref 0–3)
TTG IGG SER-ACNC: 2 U/ML (ref 0–5)

## 2022-07-18 ENCOUNTER — PATIENT MESSAGE (OUTPATIENT)
Dept: FAMILY MEDICINE CLINIC | Facility: CLINIC | Age: 37
End: 2022-07-18

## 2022-09-20 ENCOUNTER — OFFICE VISIT (OUTPATIENT)
Dept: FAMILY MEDICINE CLINIC | Facility: CLINIC | Age: 37
End: 2022-09-20
Payer: COMMERCIAL

## 2022-09-20 VITALS
HEART RATE: 84 BPM | WEIGHT: 225.13 LBS | OXYGEN SATURATION: 97 % | BODY MASS INDEX: 38.44 KG/M2 | SYSTOLIC BLOOD PRESSURE: 122 MMHG | TEMPERATURE: 96.8 F | DIASTOLIC BLOOD PRESSURE: 80 MMHG | HEIGHT: 64 IN

## 2022-09-20 DIAGNOSIS — M54.9 UPPER BACK PAIN: Primary | ICD-10-CM

## 2022-09-20 DIAGNOSIS — R73.01 ELEVATED FASTING GLUCOSE: ICD-10-CM

## 2022-09-20 DIAGNOSIS — Z23 NEED FOR TDAP VACCINATION: ICD-10-CM

## 2022-09-20 DIAGNOSIS — F32.1 MODERATE MAJOR DEPRESSION (HCC): ICD-10-CM

## 2022-09-20 DIAGNOSIS — E78.5 DYSLIPIDEMIA: ICD-10-CM

## 2022-09-20 PROCEDURE — 90715 TDAP VACCINE 7 YRS/> IM: CPT | Performed by: PHYSICIAN ASSISTANT

## 2022-09-20 PROCEDURE — 90471 IMMUNIZATION ADMIN: CPT | Performed by: PHYSICIAN ASSISTANT

## 2022-09-20 PROCEDURE — 3725F SCREEN DEPRESSION PERFORMED: CPT | Performed by: PHYSICIAN ASSISTANT

## 2022-09-20 PROCEDURE — 99214 OFFICE O/P EST MOD 30 MIN: CPT | Performed by: PHYSICIAN ASSISTANT

## 2022-09-20 NOTE — LETTER
September 20, 2022     Patient: Scarlet Felix  YOB: 1985  Date of Visit: 9/20/2022      To Whom it May Concern:    Abdon Lee is under my professional care  Teresa Hurtado was seen in my office on 9/20/2022  Teresa Hurtado  has had ongoing problems with generalized back pain of the upper back and shoulder area  This back pain has not been amenable to over-the-counter treatment or home stretching exercises  Patient is interested in pursuing breast reduction therapy and it is my opinion that this will help to alleviate a large portion of her upper back and shoulder pain  She has a mild amount of scoliosis present in the upper back and this could further be exacerbated by excess weight in the area  Please consider covering breast reduction procedure as medically necessary  If you have any questions or concerns, please don't hesitate to call           Sincerely,          Milvia Lundberg PA-C        CC: No Recipients

## 2022-09-20 NOTE — PROGRESS NOTES
Assessment and Plan:  Patient Instructions     Assessment/plan:   Upper thoracic back and shoulder pain-patient may have a small amount of scoliosis  Further survey could be performed with x-ray study however patient does not have significant limitation in her activities of daily living or sleeping  She is interested in pursuing breast reduction therapy and I believe this will help alleviate a lot of the chronic muscle strain that she has in the area  Would recommend follow-up after surgery to consider further evaluation as necessary  Patient verbalizes understanding and agreement with plan  2   Need for tetanus vaccination-patient will be given booster today  3  Obesity-patient continues with weight loss efforts, enjoys walking regularly  4   History of elevated cholesterol-will assess lipid panel and follow-up as necessary  5   History of elevated fasting glucose -will assess hemoglobin A1c  There is family history of type 1 diabetes          Problem List Items Addressed This Visit        Other    Dyslipidemia    Relevant Orders    CBC and differential    Comprehensive metabolic panel    Lipid Panel with Direct LDL reflex    TSH, 3rd generation with Free T4 reflex    Hemoglobin A1C      Other Visit Diagnoses     Upper back pain    -  Primary    Need for Tdap vaccination        Relevant Orders    TDAP VACCINE GREATER THAN OR EQUAL TO 6YO IM    Elevated fasting glucose        Relevant Orders    CBC and differential    Comprehensive metabolic panel    Lipid Panel with Direct LDL reflex    TSH, 3rd generation with Free T4 reflex    Hemoglobin A1C    Moderate major depression (HCC)        Relevant Orders    TSH, 3rd generation with Free T4 reflex                 Diagnoses and all orders for this visit:    Upper back pain    Need for Tdap vaccination  -     TDAP VACCINE GREATER THAN OR EQUAL TO 6YO IM    Elevated fasting glucose  -     CBC and differential  -     Comprehensive metabolic panel  -     Lipid Panel with Direct LDL reflex  -     TSH, 3rd generation with Free T4 reflex  -     Hemoglobin A1C    Dyslipidemia  -     CBC and differential  -     Comprehensive metabolic panel  -     Lipid Panel with Direct LDL reflex  -     TSH, 3rd generation with Free T4 reflex  -     Hemoglobin A1C    Moderate major depression (HCC)  -     TSH, 3rd generation with Free T4 reflex    Other orders  -     Omega-3 Fatty Acids (FISH OIL ADULT GUMMIES PO); Take by mouth            Subjective:      Patient ID: Cisco Howell is a 42 y o  female  CC:    Chief Complaint   Patient presents with    Back Pain     Upper back as well as right shoulder discomfort x 1-2 years  Pt states she went for a consult to have breast reduction and would also need a letter on the back issue  Pt requests a TDAP pt states she is due   mgb       HPI:      HPI:  This is a 58-year-old female who presents to the office with ongoing pain in the upper back and right shoulder area  Patient states  Pain has been present for about a year to  She is interested in pursuing breast reduction therapy and feels that may help alleviate some of her pain  She was being evaluated for breast reduction by the surgeon and was told that she does have a small amount of scoliosis in the back  Patient does not seem to be having any difficulty sleeping at nighttime  She is still able to perform activities of daily  Living without limitation  The following portions of the patient's history were reviewed and updated as appropriate: allergies, current medications, past family history, past medical history, past social history, past surgical history and problem list       Review of Systems   Constitutional: Negative for chills, fatigue and fever  HENT: Negative for congestion, ear pain and sinus pressure  Eyes: Negative for visual disturbance  Respiratory: Negative for cough, chest tightness and shortness of breath      Cardiovascular: Negative for chest pain and palpitations  Gastrointestinal: Negative for diarrhea, nausea and vomiting  Endocrine: Negative for polyuria  Genitourinary: Negative for dysuria and frequency  Musculoskeletal: Negative for arthralgias and myalgias  Skin: Negative for pallor and rash  Neurological: Negative for dizziness, weakness, light-headedness, numbness and headaches  Psychiatric/Behavioral: Negative for agitation, behavioral problems and sleep disturbance  All other systems reviewed and are negative  Data to review:       Objective:    Vitals:    09/20/22 1047   BP: 122/80   BP Location: Left arm   Patient Position: Sitting   Cuff Size: Large   Pulse: 84   Temp: (!) 96 8 °F (36 °C)   TempSrc: Temporal   SpO2: 97%   Weight: 102 kg (225 lb 2 oz)   Height: 5' 4" (1 626 m)        Physical Exam  Constitutional:       General: She is not in acute distress  Appearance: Normal appearance  HENT:      Head: Normocephalic and atraumatic  Right Ear: Tympanic membrane normal       Left Ear: Tympanic membrane normal       Nose: No congestion or rhinorrhea  Eyes:      Conjunctiva/sclera: Conjunctivae normal       Pupils: Pupils are equal, round, and reactive to light  Neck:      Vascular: No carotid bruit  Cardiovascular:      Rate and Rhythm: Normal rate and regular rhythm  Heart sounds: No murmur heard  Pulmonary:      Effort: Pulmonary effort is normal  No respiratory distress  Breath sounds: Normal breath sounds  Abdominal:      Palpations: Abdomen is soft  Musculoskeletal:      Cervical back: Normal range of motion and neck supple  No muscular tenderness  Comments:   Small amount of scoliosis of the upper thoracic spine and noted  Lymphadenopathy:      Cervical: No cervical adenopathy  Skin:     General: Skin is warm  Capillary Refill: Capillary refill takes less than 2 seconds  Neurological:      General: No focal deficit present        Mental Status: She is alert and oriented to person, place, and time     Psychiatric:         Mood and Affect: Mood normal

## 2022-09-20 NOTE — PATIENT INSTRUCTIONS
Assessment/plan:   Upper thoracic back and shoulder pain-patient may have a small amount of scoliosis  Further survey could be performed with x-ray study however patient does not have significant limitation in her activities of daily living or sleeping  She is interested in pursuing breast reduction therapy and I believe this will help alleviate a lot of the chronic muscle strain that she has in the area  Would recommend follow-up after surgery to consider further evaluation as necessary  Patient verbalizes understanding and agreement with plan  2   Need for tetanus vaccination-patient will be given booster today  3  Obesity-patient continues with weight loss efforts, enjoys walking regularly  4   History of elevated cholesterol-will assess lipid panel and follow-up as necessary  5   History of elevated fasting glucose -will assess hemoglobin A1c  There is family history of type 1 diabetes

## 2023-08-15 ENCOUNTER — TELEPHONE (OUTPATIENT)
Dept: PLASTIC SURGERY | Facility: CLINIC | Age: 38
End: 2023-08-15

## 2023-08-15 NOTE — TELEPHONE ENCOUNTER
Pt called the office in regards to breast reduction consult. Pt has one note from 9/022 (checking to see if note is still applicable). PT will need 2 more notes from PCP and 3 months of PT or Chiro services. I reviewed all criteria with pt and emailed her a copy of the criteria as well. I let the pt know if she had any questions she was free to call the office any time during office hours.

## 2023-10-19 ENCOUNTER — OFFICE VISIT (OUTPATIENT)
Dept: FAMILY MEDICINE CLINIC | Facility: CLINIC | Age: 38
End: 2023-10-19

## 2023-10-19 ENCOUNTER — TELEPHONE (OUTPATIENT)
Dept: ADMINISTRATIVE | Facility: OTHER | Age: 38
End: 2023-10-19

## 2023-10-19 VITALS
BODY MASS INDEX: 40.63 KG/M2 | HEIGHT: 64 IN | DIASTOLIC BLOOD PRESSURE: 80 MMHG | TEMPERATURE: 97.5 F | WEIGHT: 238 LBS | OXYGEN SATURATION: 98 % | HEART RATE: 84 BPM | SYSTOLIC BLOOD PRESSURE: 110 MMHG

## 2023-10-19 DIAGNOSIS — M25.512 CHRONIC PAIN OF BOTH SHOULDERS: ICD-10-CM

## 2023-10-19 DIAGNOSIS — M54.9 UPPER BACK PAIN: Primary | ICD-10-CM

## 2023-10-19 DIAGNOSIS — M25.511 CHRONIC PAIN OF BOTH SHOULDERS: ICD-10-CM

## 2023-10-19 DIAGNOSIS — G89.29 CHRONIC PAIN OF BOTH SHOULDERS: ICD-10-CM

## 2023-10-19 NOTE — PROGRESS NOTES
Name: Dianna Wallace      : 1985      MRN: [id number]  Encounter Provider: Jacob García PA-C  Encounter Date: 10/19/2023   Encounter department: [facility]    Assessment & Plan     Patient Instructions   Assessment/plan:  1. Chronic upper back and shoulder pain-patient was seen in 2022 for same problem. She has had some progressive limitation over the past year. She does have more pain especially with physical activity such as running. She enjoys doing Lucy class twice per week but has increased pain in the shoulder area afterward. She finds that the bra straps have been digging into her shoulders and believes a lot of her pain is related to breast size. I would recommend trial of physical therapy for her symptoms. If she is still not having relief consider further evaluation with plastic surgery for breast reduction. Patient verbalizes understanding and agreement with plan. 1. Upper back pain  -     Ambulatory Referral to Physical Therapy; Future    2. Chronic pain of both shoulders  -     Ambulatory Referral to Physical Therapy; Future        Depression Screening and Follow-up Plan: Patient was screened for depression during today's encounter. They screened negative with a PHQ-2 score of 0. Subjective      HPI: This is a 47-year-old female who presents to the office with chronic pain in the upper shoulders and back area that has been for more than 1 year. She was seen in the office in 2022 and was bothered by the discomfort but did not have any significant limitation. She feels over the past year she has been more limited in her physical activities. Things like running are bothersome. She also enjoys doing Lucy class II times per week but has discomfort in the shoulder and back area afterward. Pain by the end of the day seems worse when she has been active.   She does believe that breast size has a lot to do with this as of the bra straps tend to pull on the shoulder and neck area. She is interested in pursuing breast reduction surgery if necessary but is willing to try physical therapy first.    Back Pain  This is a chronic problem. The current episode started more than 1 year ago. The problem occurs constantly. The problem has been waxing and waning since onset. The pain is present in the thoracic spine. The quality of the pain is described as aching. The pain does not radiate. The pain is at a severity of 7/10. The pain is Worse during the day. The symptoms are aggravated by position, sitting, standing and stress. Stiffness is present All day. Associated symptoms include headaches. Pertinent negatives include no abdominal pain, bladder incontinence, bowel incontinence, chest pain, dysuria, fever, leg pain, numbness, paresis, paresthesias, pelvic pain, perianal numbness, tingling, weakness or weight loss. Review of Systems   Constitutional:  Negative for fever and weight loss. Cardiovascular:  Negative for chest pain. Gastrointestinal:  Negative for abdominal pain and bowel incontinence. Genitourinary:  Negative for bladder incontinence, dysuria and pelvic pain. Musculoskeletal:  Positive for back pain and myalgias. Negative for arthralgias. Neurological:  Positive for headaches. Negative for tingling, weakness, numbness and paresthesias.        Current Outpatient Medications on File Prior to Visit   Medication Sig   • escitalopram (LEXAPRO) 20 mg tablet Take 1 tablet (20 mg total) by mouth daily   • multivitamin (THERAGRAN) TABS Take 1 tablet by mouth daily   • [DISCONTINUED] Calcium Carb-Cholecalciferol (Calcium 500 + D3) 500-600 MG-UNIT TABS  (Patient not taking: Reported on 10/19/2023)   • [DISCONTINUED] Cobalamin Combinations (B-12) 1000-400 MCG SUBL  (Patient not taking: Reported on 9/20/2022)   • [DISCONTINUED] norethindrone (MICRONOR) 0.35 MG tablet Take 0.35 mg by mouth daily   • [DISCONTINUED] Omega-3 Fatty Acids (FISH OIL ADULT GUMMIES PO) Take by mouth (Patient not taking: Reported on 10/19/2023)       Objective     /80 (BP Location: Left arm, Patient Position: Sitting, Cuff Size: Large)   Pulse 84   Temp 97.5 °F (36.4 °C) (Tympanic)   Ht 5' 4" (1.626 m)   Wt 108 kg (238 lb)   SpO2 98%   BMI 40.85 kg/m²     Physical Exam  Vitals and nursing note reviewed. Constitutional:       General: She is not in acute distress. Appearance: She is well-developed. HENT:      Head: Normocephalic and atraumatic. Right Ear: External ear normal.      Left Ear: External ear normal.      Nose: Nose normal.      Mouth/Throat:      Pharynx: No oropharyngeal exudate. Eyes:      Conjunctiva/sclera: Conjunctivae normal.      Pupils: Pupils are equal, round, and reactive to light. Neck:      Thyroid: No thyromegaly. Trachea: No tracheal deviation. Cardiovascular:      Rate and Rhythm: Normal rate and regular rhythm. Heart sounds: Normal heart sounds. No murmur heard. No friction rub. Pulmonary:      Effort: Pulmonary effort is normal. No respiratory distress. Breath sounds: Normal breath sounds. No wheezing or rales. Abdominal:      General: Bowel sounds are normal. There is no distension. Palpations: Abdomen is soft. Tenderness: There is no abdominal tenderness. There is no guarding or rebound. Musculoskeletal:         General: Tenderness present. Normal range of motion. Cervical back: Normal range of motion and neck supple. Lymphadenopathy:      Cervical: No cervical adenopathy. Skin:     General: Skin is warm and dry. Findings: No erythema or rash. Neurological:      Mental Status: She is alert and oriented to person, place, and time. Cranial Nerves: No cranial nerve deficit. Coordination: Coordination normal.   Psychiatric:         Behavior: Behavior normal.         Thought Content:  Thought content normal.       Shelbie Shahid PA-C

## 2023-10-19 NOTE — TELEPHONE ENCOUNTER
Upon review of the In Basket request we were able to locate, review, and update the patient chart as requested for Mammogram and Pap Smear (HPV) aka Cervical Cancer Screening. Any additional questions or concerns should be emailed to the Practice Liaisons via the appropriate education email address, please do not reply via In Basket.     Thank you  Ayana Lemos MA

## 2023-10-19 NOTE — PATIENT INSTRUCTIONS
Assessment/plan:  1. Chronic upper back and shoulder pain-patient was seen in September 2022 for same problem. She has had some progressive limitation over the past year. She does have more pain especially with physical activity such as running. She enjoys doing Lucy class twice per week but has increased pain in the shoulder area afterward. She finds that the bra straps have been digging into her shoulders and believes a lot of her pain is related to breast size. I would recommend trial of physical therapy for her symptoms. If she is still not having relief consider further evaluation with plastic surgery for breast reduction. Patient verbalizes understanding and agreement with plan.

## 2023-10-19 NOTE — TELEPHONE ENCOUNTER
----- Message from Noe Mauricio sent at 10/19/2023 10:01 AM EDT -----  Regarding: Alec Lax Request  10/19/23 10:01 AM    Hello, our patient Maria E Barrios has had Mammogram and Pap Smear (HPV) aka Cervical Cancer Screening completed/performed. Please assist in updating the patient chart by pulling the Care Everywhere (CE) document. The date of service is Pap 3/09/21 and mammogram 8/29/23.      Thank you,  Noe [facility]

## 2023-10-31 ENCOUNTER — EVALUATION (OUTPATIENT)
Dept: PHYSICAL THERAPY | Facility: CLINIC | Age: 38
End: 2023-10-31
Payer: COMMERCIAL

## 2023-10-31 DIAGNOSIS — M25.512 CHRONIC PAIN OF BOTH SHOULDERS: ICD-10-CM

## 2023-10-31 DIAGNOSIS — G89.29 CHRONIC PAIN OF BOTH SHOULDERS: ICD-10-CM

## 2023-10-31 DIAGNOSIS — M54.9 UPPER BACK PAIN: ICD-10-CM

## 2023-10-31 DIAGNOSIS — M25.511 CHRONIC PAIN OF BOTH SHOULDERS: ICD-10-CM

## 2023-10-31 PROCEDURE — 97161 PT EVAL LOW COMPLEX 20 MIN: CPT | Performed by: PHYSICAL THERAPIST

## 2023-10-31 NOTE — PROGRESS NOTES
PT Evaluation     Today's date: 10/31/2023  Patient name: Sangita Lake  : 1985  MRN: [id number]  Referring provider: Kayley Lynch PA-C  Dx:   Encounter Diagnosis     ICD-10-CM    1. Upper back pain  M54.9 Ambulatory Referral to Physical Therapy      2. Chronic pain of both shoulders  M25.511 Ambulatory Referral to Physical Therapy    G89.29     M25.512                      Assessment  Assessment details: Patient is a 34 y.o. female who  presents with upper thoracic and shoulder pain associated with periscapular weakness. Her large breasts likely are contributing to her pains. She has functional limitations and a decreased quality of life as a result of impairments. Patient may benefit from course of skilled physical therapy to address above listed impairments in an effort to improve function. Considering breast reduction should conservative means fail to relieve symptoms. Understanding of Dx/Px/POC: good   Prognosis: fair    Goals  Short Term Goals:  1) Pain : Decrease pain to 2/10 at worst x 1 continuous week within 2-3 weeks. 2) Strength: Improved UE strength by at least 1/2 grade for all noted as weak within 2-3 weeks. 3) Function: Improved FOTO score from IE within 2-3 weeks (62@ IE) . LongTerm Goals:  1) Pain : Eliminate ain  x 1 continuous week within 4-6 weeks. 2) Strength: Improved UE strength to 5/5 within 6 weeks. 3) Function: Improved FOTO score to at least  73; no reported difficulty with ADLs within 4-6 weeks. 4) (I) with HEP within 4-6 weeks.     Plan  Patient would benefit from: skilled physical therapy  Planned modality interventions: thermotherapy: hydrocollator packs, TENS and cryotherapy (all prn)  Planned therapy interventions: stretching, strengthening, home exercise program, therapeutic activities, therapeutic exercise, neuromuscular re-education and postural training  Duration in visits: 3  Plan of Care beginning date: 10/31/2023  Plan of Care expiration date: 2023  Treatment plan discussed with: patient      Subjective Evaluation    History of Present Illness  Mechanism of injury: Patient is a 34 y.o. old female who presents for an initial outpatient physical therapy consultation regarding her upper back and shoulder pain. .      Several year history of pain. Not associated with trauma. She has large breasts. Pressure on bra strap on shoulder specifically aggravating. Recent consultatin with PCP, referred for course of physical therapy, she is also considering breast reduction      Has pain most of the time at a level of 4/10 described as an ache. If she does not wear a bra, pain is not as bad. Consulted with her PCP regarding pain and was referred for course of physical therapy. 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 Right   Tenderness of the lower trapezius and upper trapezius.      Active Range of Motion   Cervical/Thoracic Spine       Thoracic    Flexion:  Restriction level: moderate  Extension:  Restriction level: moderate  Left lateral flexion:  Restriction level: minimal  Right lateral flexion:  Restriction level: minimal  Left Shoulder   Normal active range of motion    Right Shoulder   Normal active range of motion    Passive Range of Motion   Left Shoulder   Normal passive range of motion    Right Shoulder   Normal passive range of motion    Joint Play     Hypomobile: T1, T2 and T3     Pain: T1 and T2     Strength/Myotome Testing     Left Shoulder     Isolated Muscles   Lower trapezius: 3+   Rhomboids: 4-   Serratus anterior: 5     Right Shoulder     Isolated Muscles   Lower trapezius: 4-   Rhomboids: 4-   Serratus anterior: 5         (R)  C5- shoulder abduction :5  C6- Elbow flexion Wrist extension :5  C7 - Elbow extension :5  C8 - Finger flexion :5    (L)  C5- shoulder abduction :5  C6- Elbow flexion Wrist extension :5  C7 - Elbow extension :5  C8 - Finger flexion :5         Precautions: none      Manuals 10/31/23                                                                Neuro Re-Ed                                                                                                        Ther Ex             UBE NV            Scap 4 with Tband NV                                                                                          Ther Activity                                       Gait Training                                       Modalities                          IE/HEP RG

## 2023-11-14 ENCOUNTER — APPOINTMENT (OUTPATIENT)
Dept: PHYSICAL THERAPY | Facility: CLINIC | Age: 38
End: 2023-11-14
Payer: COMMERCIAL

## 2023-11-21 ENCOUNTER — OFFICE VISIT (OUTPATIENT)
Dept: PHYSICAL THERAPY | Facility: CLINIC | Age: 38
End: 2023-11-21
Payer: COMMERCIAL

## 2023-11-21 DIAGNOSIS — M25.512 CHRONIC PAIN OF BOTH SHOULDERS: ICD-10-CM

## 2023-11-21 DIAGNOSIS — M54.9 UPPER BACK PAIN: Primary | ICD-10-CM

## 2023-11-21 DIAGNOSIS — M25.511 CHRONIC PAIN OF BOTH SHOULDERS: ICD-10-CM

## 2023-11-21 DIAGNOSIS — G89.29 CHRONIC PAIN OF BOTH SHOULDERS: ICD-10-CM

## 2023-11-21 PROCEDURE — 97110 THERAPEUTIC EXERCISES: CPT | Performed by: PHYSICAL THERAPIST

## 2023-11-21 NOTE — PROGRESS NOTES
Daily Note     Today's date: 2023  Patient name: Shannan Gross  : 1985  MRN: [id number]  Referring provider: Figueroa Osman PA-C  Dx:   Encounter Diagnosis     ICD-10-CM    1. Upper back pain  M54.9       2. Chronic pain of both shoulders  M25.511     G89.29     M25.512                      Subjective: Has been doing her home exercises. Feeling about the same as far as shoulder, upper back pain. Objective: See treatment diary below. Progressed periscapular strengthening program.      Assessment: Tolerated treatment well. Limited progress thus far. Patient maybenefit from continued course of skilled physical therapy to address impairments in an effort to improve function. Plan: Continue per plan of care. Continue HEP, follow up in 4-5 weeks.      Precautions: none      Manuals 10/31/23 11/21           Theragun (B) upper traps, rhomboids/middle traps  1900 x 5min                                                  Neuro Re-Ed                                                                                                        Ther Ex             UBE NV 3/3           Scap 4 with Tband NV Green  3sec  2 x 15                                                                                         Ther Activity                                       Gait Training                                       Modalities                          IE/HEP RG

## 2024-03-06 ENCOUNTER — OFFICE VISIT (OUTPATIENT)
Dept: PHYSICAL THERAPY | Facility: CLINIC | Age: 39
End: 2024-03-06
Payer: COMMERCIAL

## 2024-03-06 DIAGNOSIS — M54.9 UPPER BACK PAIN: Primary | ICD-10-CM

## 2024-03-06 DIAGNOSIS — M25.511 CHRONIC PAIN OF BOTH SHOULDERS: ICD-10-CM

## 2024-03-06 DIAGNOSIS — G89.29 CHRONIC PAIN OF BOTH SHOULDERS: ICD-10-CM

## 2024-03-06 DIAGNOSIS — M25.512 CHRONIC PAIN OF BOTH SHOULDERS: ICD-10-CM

## 2024-03-06 PROCEDURE — 97164 PT RE-EVAL EST PLAN CARE: CPT | Performed by: PHYSICAL THERAPIST

## 2024-03-06 NOTE — PROGRESS NOTES
Progress  Note /P.T. Discharge    Today's date: 3/6/2024  Patient name: Daysi Bonilla  : 1985  MRN: [id number]  Referring provider: Matthew Worthy PA-C  Dx:   Encounter Diagnosis     ICD-10-CM    1. Upper back pain  M54.9       2. Chronic pain of both shoulders  M25.511     G89.29     M25.512             Assessment  Assessment details: Patient is a 38 y.o. female  with upper thoracic and shoulder pain associated with periscapular weakness. Her large breasts likely are contributing to her pains.    Physical therapy has failed to relieve pain.      She still has functional limitations and a decreased quality of life as a result of impairments.     Considering breast reduction.     Recommend surgical consultation.    Understanding of Dx/Px/POC: good   Prognosis: fair     Goals  Short Term Goals:  1) Pain : Decrease pain to 2/10 at worst x 1 continuous week within 2-3 weeks.-not myet  2) Strength: Improved UE strength by at least 1/2 grade for all noted as weak within 2-3 weeks.-met  3) Function: Improved FOTO score from IE within 2-3 weeks (62@ IE) .-not met     LongTerm Goals:  1) Pain : Eliminate ain  x 1 continuous week within 4-6 weeks.-not met  2) Strength: Improved UE strength to 5/5 within 6 weeks.-not met  3) Function: Improved FOTO score to at least  73; no reported difficulty with ADLs within 4-6 weeks.-not met  4) (I) with HEP within 4-6 weeks.-met     Plan  Advised patient to continue with home exercise program which I reviewed with them today. Advised patient to contact me with any future questions or concerns regarding exercise. Pt is in agreement with discharge plan.          Subjective Evaluation     History of Present Illness  Mechanism of injury: Patient is a 38 y.o. old female who presents for an initial outpatient physical therapy consultation regarding her upper back and shoulder pain..        Several year history of pain. Not associated with trauma.She has large breasts. Pressure on  bra strap on shoulder specifically aggravating.      Recent consultatin with PCP, referred for course of physical therapy, she is also considering breast reduction        Has pain most of the time at a level of 4/10 described as an ache.  If she does not wear a bra, pain is not as bad.     Consulted with her PCP regarding pain and was referred for course of physical therapy.             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 Has been doing exercises and still feels the same kinds of pains across top of shoulders and upper back. 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       Patient goals for therapy: decreased pain and increased strength  Patient Goals: has made some strength gains but pain persists.       Pain  Current pain ratin                Objective      Palpation   Left   Tenderness of the lower trapezius and upper trapezius.      Right   Tenderness of the lower trapezius and upper trapezius.      Active Range of Motion   Cervical/Thoracic Spine         Thoracic     Flexion:  Restriction level: minimal  Extension:  Restriction level: moderate  Left lateral flexion:  Restriction level: minimal  Right lateral flexion:  Restriction level: minimal    Left Shoulder   Normal active range of motion     Right Shoulder   Normal active range of motion     Passive Range of Motion   Left Shoulder   Normal passive range of motion     Right Shoulder   Normal passive range of motion     Joint Play      Hypomobile: T2-T8          Strength/Myotome Testing      Left Shoulder      Isolated Muscles   Lower trapezius: 4-  Rhomboids: 4  Serratus  anterior: 5      Right Shoulder      Isolated Muscles   Lower trapezius: 4  Rhomboids: 4  Serratus anterior: 5            (R)  C5- shoulder abduction :5  C6- Elbow flexion Wrist extension :5  C7 - Elbow extension :5  C8 - Finger flexion :5     (L)  C5- shoulder abduction :5  C6- Elbow flexion Wrist extension :5  C7 - Elbow extension :4+ (notes recent elbow injury)  C8 - Finger flexion :5            Precautions: none      Manuals 10/31/23 11/21 3/6          Theragun (B) upper traps, rhomboids/middle traps  1900 x 5min                                                  Neuro Re-Ed                                                                                                        Ther Ex             UBE NV 3/3           Scap 4 with Tband NV Green  3sec  2 x 15                        Tband horizontal Abduction   [place]  2 x 10                                                              Ther Activity                                       Gait Training                                       Modalities                          IE/HEP RG  RVL

## 2024-03-15 ENCOUNTER — OFFICE VISIT (OUTPATIENT)
Dept: FAMILY MEDICINE CLINIC | Facility: CLINIC | Age: 39
End: 2024-03-15
Payer: COMMERCIAL

## 2024-03-15 VITALS
DIASTOLIC BLOOD PRESSURE: 78 MMHG | BODY MASS INDEX: 40.92 KG/M2 | SYSTOLIC BLOOD PRESSURE: 118 MMHG | TEMPERATURE: 96.7 F | WEIGHT: 238.38 LBS

## 2024-03-15 DIAGNOSIS — F41.1 GENERALIZED ANXIETY DISORDER: ICD-10-CM

## 2024-03-15 DIAGNOSIS — M25.511 CHRONIC PAIN OF BOTH SHOULDERS: ICD-10-CM

## 2024-03-15 DIAGNOSIS — J30.1 ALLERGIC RHINITIS DUE TO POLLEN, UNSPECIFIED SEASONALITY: ICD-10-CM

## 2024-03-15 DIAGNOSIS — E78.5 DYSLIPIDEMIA: ICD-10-CM

## 2024-03-15 DIAGNOSIS — N62 LARGE BREASTS: Primary | ICD-10-CM

## 2024-03-15 DIAGNOSIS — M25.512 CHRONIC PAIN OF BOTH SHOULDERS: ICD-10-CM

## 2024-03-15 DIAGNOSIS — M54.9 UPPER BACK PAIN: ICD-10-CM

## 2024-03-15 DIAGNOSIS — G89.29 CHRONIC PAIN OF BOTH SHOULDERS: ICD-10-CM

## 2024-03-15 DIAGNOSIS — R73.01 ELEVATED FASTING GLUCOSE: ICD-10-CM

## 2024-03-15 PROCEDURE — 99214 OFFICE O/P EST MOD 30 MIN: CPT | Performed by: PHYSICIAN ASSISTANT

## 2024-03-15 NOTE — PROGRESS NOTES
Name: Daysi Bonilla      : 1985      MRN: [id number]  Encounter Provider: Matthew Worthy PA-C  Encounter Date: 3/15/2024   Encounter department: [facility]    Assessment & Plan     Patient Instructions   Assessment/plan:  Large Breast Size-patient has had chronic upper back and neck pain which is likely related to breast mass.  She has been uncomfortable wearing bra and has to take it off throughout the course of the day to alleviate pain.  She has tried NSAIDs with little relief.  She has also gone through physical therapy treatments with little benefit.  Will consult plastic surgery to discuss reconstructive options.  2.   Chronic upper Back and Neck pain.-Likely secondary to breast mass as above.  Patient has tried all conventional normal therapies such as physical therapy, NSAIDs, removing the bra, but has had persistent symptoms.  3.  Anxiety-presently stable on Lexapro therapy through the [facility].  4.  Dyslipidemia-status unknown.  Recommend reassessing comprehensive labs.    1. Large breasts  -     Ambulatory Referral to Plastic Surgery; Future    2. Chronic pain of both shoulders  -     Ambulatory Referral to Plastic Surgery; Future    3. Generalized anxiety disorder  -     CBC and differential  -     Comprehensive metabolic panel  -     Hemoglobin A1C  -     Lipid Panel with Direct LDL reflex  -     TSH, 3rd generation with Free T4 reflex    4. Dyslipidemia  -     CBC and differential  -     Comprehensive metabolic panel  -     Hemoglobin A1C  -     Lipid Panel with Direct LDL reflex  -     TSH, 3rd generation with Free T4 reflex    5. Allergic rhinitis due to pollen, unspecified seasonality    6. Elevated fasting glucose  -     CBC and differential  -     Comprehensive metabolic panel  -     Hemoglobin A1C  -     Lipid Panel with Direct LDL reflex  -     TSH, 3rd generation with Free T4 reflex    7. Upper back pain  -     Ambulatory Referral to Plastic Surgery;  Future           Subjective      HPI: This is a 38-year-old female that presents to the office with chronic upper back and neck pain that has been persistent for over a year and a half.  She had initial visit in September 2022, again in October 2023, and now today.  She has done physical therapy for this with very minor benefit.  She continues to have a lot of tension in the trapezius area and shoulder muscles.  This is likely contributed to by her large breast size.  Patient is very uncomfortable wearing a bra through the full day and has to take it off regularly.  She also has some flareups at times and uses ibuprofen for the pain.  She is interested in pursuing breast reduction if it would help limit her pain and discomfort.    Back Pain  This is a chronic problem. The current episode started more than 1 year ago. The problem occurs constantly. The problem has been waxing and waning since onset. The pain is present in the thoracic spine. The quality of the pain is described as aching. The pain does not radiate. The pain is at a severity of 5/10. The pain is Worse during the day. The symptoms are aggravated by bending, position, sitting and twisting. Stiffness is present All day. Pertinent negatives include no abdominal pain, bladder incontinence, bowel incontinence, chest pain, dysuria, fever, headaches, leg pain, numbness, paresis, paresthesias, pelvic pain, perianal numbness, tingling, weakness or weight loss. Risk factors include obesity.     Review of Systems   Constitutional:  Negative for fever and weight loss.   Cardiovascular:  Negative for chest pain.   Gastrointestinal:  Negative for abdominal pain and bowel incontinence.   Genitourinary:  Negative for bladder incontinence, dysuria and pelvic pain.   Musculoskeletal:  Positive for back pain.   Neurological:  Negative for tingling, weakness, numbness, headaches and paresthesias.       Current Outpatient Medications on File Prior to Visit   Medication Sig    • escitalopram (LEXAPRO) 20 mg tablet Take 1 tablet (20 mg total) by mouth daily   • multivitamin (THERAGRAN) TABS Take 1 tablet by mouth daily       Objective     /78 (BP Location: Left arm, Patient Position: Sitting, Cuff Size: Large)   Temp (!) 96.7 °F (35.9 °C) (Temporal)   Wt 108 kg (238 lb 6 oz)   BMI 40.92 kg/m²     Physical Exam  Vitals and nursing note reviewed.   Constitutional:       General: She is not in acute distress.     Appearance: She is well-developed.   HENT:      Head: Normocephalic and atraumatic.      Right Ear: External ear normal.      Left Ear: External ear normal.      Nose: Nose normal.      Mouth/Throat:      Pharynx: No oropharyngeal exudate.   Eyes:      Conjunctiva/sclera: Conjunctivae normal.      Pupils: Pupils are equal, round, and reactive to light.   Neck:      Thyroid: No thyromegaly.      Trachea: No tracheal deviation.   Cardiovascular:      Rate and Rhythm: Normal rate and regular rhythm.      Heart sounds: Normal heart sounds. No murmur heard.     No friction rub.   Pulmonary:      Effort: Pulmonary effort is normal. No respiratory distress.      Breath sounds: Normal breath sounds. No wheezing or rales.   Abdominal:      General: Bowel sounds are normal. There is no distension.      Palpations: Abdomen is soft.      Tenderness: There is no abdominal tenderness. There is no guarding or rebound.   Musculoskeletal:         General: Tenderness present. Normal range of motion.      Cervical back: Normal range of motion and neck supple.      Comments: Patient does have some tenderness and tightness to palpation at the bilateral trapezius muscles and supraspinatus.   Lymphadenopathy:      Cervical: No cervical adenopathy.   Skin:     General: Skin is warm and dry.      Findings: No erythema or rash.   Neurological:      Mental Status: She is alert and oriented to person, place, and time.      Cranial Nerves: No cranial nerve deficit.      Coordination: Coordination  normal.   Psychiatric:         Behavior: Behavior normal.         Thought Content: Thought content normal.       Matthew Worthy PA-C

## 2024-03-15 NOTE — PATIENT INSTRUCTIONS
Assessment/plan:  Large Breast Size-patient has had chronic upper back and neck pain which is likely related to breast mass.  She has been uncomfortable wearing bra and has to take it off throughout the course of the day to alleviate pain.  She has tried NSAIDs with little relief.  She has also gone through physical therapy treatments with little benefit.  Will consult plastic surgery to discuss reconstructive options.  2.   Chronic upper Back and Neck pain.-Likely secondary to breast mass as above.  Patient has tried all conventional normal therapies such as physical therapy, NSAIDs, removing the bra, but has had persistent symptoms.  3.  Anxiety-presently stable on [facility]apro therapy through [facility].  4.  Dyslipidemia-status unknown.  Recommend reassessing comprehensive labs.

## 2024-04-10 ENCOUNTER — TELEPHONE (OUTPATIENT)
Dept: PLASTIC SURGERY | Facility: CLINIC | Age: 39
End: 2024-04-10

## 2024-04-10 NOTE — TELEPHONE ENCOUNTER
Emailed current criteria for breast reduction and asked to advise me when complete.  We had been in contact in early 2023 but criteria at that time was not complete.  Need to have 2024 criteria met.

## 2024-04-11 ENCOUNTER — TELEPHONE (OUTPATIENT)
Dept: PLASTIC SURGERY | Facility: CLINIC | Age: 39
End: 2024-04-11

## 2024-04-11 NOTE — TELEPHONE ENCOUNTER
Replied to patient's email and advised we need 6 current months of physical therapy notes documenting her shoulder and back pain.  To date, physical therapy in chart is 10/31/23, 11/21/23 and 3/6/2024.

## 2024-04-18 ENCOUNTER — TELEPHONE (OUTPATIENT)
Dept: FAMILY MEDICINE CLINIC | Facility: CLINIC | Age: 39
End: 2024-04-18

## 2024-04-18 ENCOUNTER — PATIENT MESSAGE (OUTPATIENT)
Dept: FAMILY MEDICINE CLINIC | Facility: CLINIC | Age: 39
End: 2024-04-18

## 2024-04-18 NOTE — TELEPHONE ENCOUNTER
Regarding: Letter for breast reduction  Contact: [phone]  ----- Message from Nannette Nolasco sent at 4/18/2024  2:41 PM EDT -----       ----- Message from Daysi Bonilla to Matthew Worthy PA-C sent at 4/18/2024  2:18 PM -----   Hi Dr. Worthy - Could you please write another letter recommending a breast reduction? You might be able to use most of the language from the letter you did for me a few years ago (I need a new letter for insurance). Thanks so much! - Daysi

## 2024-04-18 NOTE — LETTER
April 18, 2024     Patient: Daysi Bonilla  YOB: 1985        To Whom it May Concern:    Daysi Bonilla is under my professional care. Daysi was seen in my office on 3/15/2024. Daysi  has had ongoing problems with generalized back pain of the upper back and shoulder area.  This back pain has not been amenable to over-the-counter treatment or home stretching exercises.  She has been seen in our office several times regarding this pain and has not responded to conservative treatment measures either.  Patient has been through a course of physical therapy without significant benefit.  Patient is interested in pursuing breast reduction therapy and it is my opinion that this will help to alleviate a large portion of her upper back and shoulder pain.  She has a mild amount of scoliosis present in the upper back and this could further be exacerbated by excess weight in the area.  Please consider covering breast reduction procedure as medically necessary.    If you have any questions or concerns, please don't hesitate to call.         Sincerely,          Matthew Worthy PA-C        CC: No Recipients

## 2024-06-12 ENCOUNTER — ANESTHESIA EVENT (OUTPATIENT)
Dept: PERIOP | Facility: HOSPITAL | Age: 39
End: 2024-06-12
Payer: COMMERCIAL

## 2024-06-12 NOTE — PRE-PROCEDURE INSTRUCTIONS
Pre-Surgery Instructions:   Medication Instructions    escitalopram (LEXAPRO) 20 mg tablet Take night before surgery    multivitamin (THERAGRAN) TABS Instructions provided by MD    Medication instructions for day surgery reviewed. Please use only a sip of water to take your instructed medications. Avoid all over the counter vitamins, supplements and NSAIDS for one week prior to surgery per anesthesia guidelines. Tylenol is ok to take as needed.     You will receive a call one business day prior to surgery with an arrival time and hospital directions. If your surgery is scheduled on a Monday, the hospital will be calling you on the Friday prior to your surgery. If you have not heard from anyone by 8pm, please call the hospital supervisor through the hospital  at [phone]. ([place] [phone] or [place] [phone]).    Do not eat or drink anything after midnight the night before your surgery, including candy, mints, lifesavers, or chewing gum. Do not drink alcohol 24hrs before your surgery. Try not to smoke at least 24hrs before your surgery.       Follow the pre surgery showering instructions as listed in the “My Surgical Experience Booklet” or otherwise provided by your surgeon's office. Do not use a blade to shave the surgical area 1 week before surgery. It is okay to use a clean electric clippers up to 24 hours before surgery. Do not apply any lotions, creams, including makeup, cologne, deodorant, or perfumes after showering on the day of your surgery. Do not use dry shampoo, hair spray, hair gel, or any type of hair products.     No contact lenses, eye make-up, or artificial eyelashes. Remove nail polish, including gel polish, and any artificial, gel, or acrylic nails if possible. Remove all jewelry including rings and body piercing jewelry.     Wear causal clothing that is easy to take on and off. Consider your type of surgery.    Keep any valuables, jewelry, piercings at home. Please bring any  specially ordered equipment (sling, braces) if indicated.    Arrange for a responsible person to drive you to and from the hospital on the day of your surgery. Please confirm the visitor policy for the day of your procedure when you receive your phone call with an arrival time.     Call the surgeon's office with any new illnesses, exposures, or additional questions prior to surgery.    Please reference your “My Surgical Experience Booklet” for additional information to prepare for your upcoming surgery.

## 2024-06-21 ENCOUNTER — ANESTHESIA (OUTPATIENT)
Dept: PERIOP | Facility: HOSPITAL | Age: 39
End: 2024-06-21
Payer: COMMERCIAL

## 2024-06-21 ENCOUNTER — HOSPITAL ENCOUNTER (OUTPATIENT)
Facility: HOSPITAL | Age: 39
Setting detail: OUTPATIENT SURGERY
Discharge: HOME | End: 2024-06-21
Attending: SURGERY | Admitting: SURGERY
Payer: COMMERCIAL

## 2024-06-21 VITALS
HEART RATE: 77 BPM | TEMPERATURE: 97 F | WEIGHT: 231.48 LBS | SYSTOLIC BLOOD PRESSURE: 120 MMHG | OXYGEN SATURATION: 96 % | RESPIRATION RATE: 20 BRPM | BODY MASS INDEX: 39.52 KG/M2 | HEIGHT: 64 IN | DIASTOLIC BLOOD PRESSURE: 66 MMHG

## 2024-06-21 DIAGNOSIS — M54.89 OTHER BACK PAIN, UNSPECIFIED CHRONICITY: ICD-10-CM

## 2024-06-21 DIAGNOSIS — N62 HYPERTROPHY OF BREAST: ICD-10-CM

## 2024-06-21 LAB
EXT PREGNANCY TEST URINE: NEGATIVE
EXT. CONTROL: NORMAL

## 2024-06-21 PROCEDURE — 88305 TISSUE EXAM BY PATHOLOGIST: CPT | Performed by: PATHOLOGY

## 2024-06-21 PROCEDURE — 81025 URINE PREGNANCY TEST: CPT | Performed by: ANESTHESIOLOGY

## 2024-06-21 RX ORDER — KETOROLAC TROMETHAMINE 30 MG/ML
INJECTION, SOLUTION INTRAMUSCULAR; INTRAVENOUS AS NEEDED
Status: DISCONTINUED | OUTPATIENT
Start: 2024-06-21 | End: 2024-06-21

## 2024-06-21 RX ORDER — ROCURONIUM BROMIDE 10 MG/ML
INJECTION, SOLUTION INTRAVENOUS AS NEEDED
Status: DISCONTINUED | OUTPATIENT
Start: 2024-06-21 | End: 2024-06-21

## 2024-06-21 RX ORDER — GLYCOPYRROLATE 0.2 MG/ML
INJECTION INTRAMUSCULAR; INTRAVENOUS AS NEEDED
Status: DISCONTINUED | OUTPATIENT
Start: 2024-06-21 | End: 2024-06-21

## 2024-06-21 RX ORDER — OXYCODONE HYDROCHLORIDE AND ACETAMINOPHEN 5; 325 MG/1; MG/1
1 TABLET ORAL EVERY 4 HOURS PRN
Status: DISCONTINUED | OUTPATIENT
Start: 2024-06-21 | End: 2024-06-21 | Stop reason: HOSPADM

## 2024-06-21 RX ORDER — ONDANSETRON 2 MG/ML
INJECTION INTRAMUSCULAR; INTRAVENOUS AS NEEDED
Status: DISCONTINUED | OUTPATIENT
Start: 2024-06-21 | End: 2024-06-21

## 2024-06-21 RX ORDER — CEFAZOLIN SODIUM 1 G/3ML
INJECTION, POWDER, FOR SOLUTION INTRAMUSCULAR; INTRAVENOUS AS NEEDED
Status: DISCONTINUED | OUTPATIENT
Start: 2024-06-21 | End: 2024-06-21

## 2024-06-21 RX ORDER — PROMETHAZINE HYDROCHLORIDE 25 MG/ML
12.5 INJECTION, SOLUTION INTRAMUSCULAR; INTRAVENOUS EVERY 4 HOURS PRN
Status: DISCONTINUED | OUTPATIENT
Start: 2024-06-21 | End: 2024-06-21 | Stop reason: HOSPADM

## 2024-06-21 RX ORDER — CEFAZOLIN SODIUM 2 G/50ML
2000 SOLUTION INTRAVENOUS ONCE
Status: COMPLETED | OUTPATIENT
Start: 2024-06-21 | End: 2024-06-21

## 2024-06-21 RX ORDER — LIDOCAINE HYDROCHLORIDE 20 MG/ML
INJECTION, SOLUTION EPIDURAL; INFILTRATION; INTRACAUDAL; PERINEURAL AS NEEDED
Status: DISCONTINUED | OUTPATIENT
Start: 2024-06-21 | End: 2024-06-21

## 2024-06-21 RX ORDER — ACETAMINOPHEN 325 MG/1
650 TABLET ORAL EVERY 6 HOURS PRN
Status: DISCONTINUED | OUTPATIENT
Start: 2024-06-21 | End: 2024-06-21 | Stop reason: HOSPADM

## 2024-06-21 RX ORDER — ONDANSETRON 2 MG/ML
4 INJECTION INTRAMUSCULAR; INTRAVENOUS EVERY 6 HOURS PRN
Status: DISCONTINUED | OUTPATIENT
Start: 2024-06-21 | End: 2024-06-21 | Stop reason: HOSPADM

## 2024-06-21 RX ORDER — DEXAMETHASONE SODIUM PHOSPHATE 10 MG/ML
INJECTION, SOLUTION INTRAMUSCULAR; INTRAVENOUS AS NEEDED
Status: DISCONTINUED | OUTPATIENT
Start: 2024-06-21 | End: 2024-06-21

## 2024-06-21 RX ORDER — FENTANYL CITRATE 50 UG/ML
INJECTION, SOLUTION INTRAMUSCULAR; INTRAVENOUS AS NEEDED
Status: DISCONTINUED | OUTPATIENT
Start: 2024-06-21 | End: 2024-06-21

## 2024-06-21 RX ORDER — SODIUM CHLORIDE, SODIUM LACTATE, POTASSIUM CHLORIDE, CALCIUM CHLORIDE 600; 310; 30; 20 MG/100ML; MG/100ML; MG/100ML; MG/100ML
INJECTION, SOLUTION INTRAVENOUS CONTINUOUS PRN
Status: DISCONTINUED | OUTPATIENT
Start: 2024-06-21 | End: 2024-06-21

## 2024-06-21 RX ORDER — SODIUM CHLORIDE 9 MG/ML
125 INJECTION, SOLUTION INTRAVENOUS CONTINUOUS
Status: DISCONTINUED | OUTPATIENT
Start: 2024-06-21 | End: 2024-06-21 | Stop reason: HOSPADM

## 2024-06-21 RX ORDER — PHENYLEPHRINE HCL IN 0.9% NACL 1 MG/10 ML
SYRINGE (ML) INTRAVENOUS AS NEEDED
Status: DISCONTINUED | OUTPATIENT
Start: 2024-06-21 | End: 2024-06-21

## 2024-06-21 RX ORDER — FENTANYL CITRATE 50 UG/ML
50 INJECTION, SOLUTION INTRAMUSCULAR; INTRAVENOUS
Status: DISCONTINUED | OUTPATIENT
Start: 2024-06-21 | End: 2024-06-21 | Stop reason: HOSPADM

## 2024-06-21 RX ORDER — PROPOFOL 10 MG/ML
INJECTION, EMULSION INTRAVENOUS AS NEEDED
Status: DISCONTINUED | OUTPATIENT
Start: 2024-06-21 | End: 2024-06-21

## 2024-06-21 RX ORDER — MIDAZOLAM HYDROCHLORIDE 2 MG/2ML
INJECTION, SOLUTION INTRAMUSCULAR; INTRAVENOUS AS NEEDED
Status: DISCONTINUED | OUTPATIENT
Start: 2024-06-21 | End: 2024-06-21

## 2024-06-21 RX ADMIN — LIDOCAINE HYDROCHLORIDE 100 MG: 20 INJECTION, SOLUTION EPIDURAL; INFILTRATION; INTRACAUDAL at 09:35

## 2024-06-21 RX ADMIN — ROCURONIUM BROMIDE 10 MG: 10 INJECTION, SOLUTION INTRAVENOUS at 11:29

## 2024-06-21 RX ADMIN — KETOROLAC TROMETHAMINE 30 MG: 30 INJECTION, SOLUTION INTRAMUSCULAR; INTRAVENOUS at 14:26

## 2024-06-21 RX ADMIN — TRANEXAMIC ACID: 100 INJECTION, SOLUTION INTRAVENOUS at 12:55

## 2024-06-21 RX ADMIN — Medication 100 MCG: at 12:52

## 2024-06-21 RX ADMIN — SUGAMMADEX 200 MG: 100 INJECTION, SOLUTION INTRAVENOUS at 14:48

## 2024-06-21 RX ADMIN — CEFAZOLIN 2000 MG: 1 INJECTION, POWDER, FOR SOLUTION INTRAMUSCULAR; INTRAVENOUS; PARENTERAL at 13:28

## 2024-06-21 RX ADMIN — ROCURONIUM BROMIDE 50 MG: 10 INJECTION, SOLUTION INTRAVENOUS at 09:36

## 2024-06-21 RX ADMIN — SODIUM CHLORIDE: 0.9 INJECTION, SOLUTION INTRAVENOUS at 10:01

## 2024-06-21 RX ADMIN — CEFAZOLIN SODIUM 2000 MG: 2 SOLUTION INTRAVENOUS at 09:40

## 2024-06-21 RX ADMIN — ROCURONIUM BROMIDE 20 MG: 10 INJECTION, SOLUTION INTRAVENOUS at 10:26

## 2024-06-21 RX ADMIN — SODIUM CHLORIDE, SODIUM LACTATE, POTASSIUM CHLORIDE, AND CALCIUM CHLORIDE: .6; .31; .03; .02 INJECTION, SOLUTION INTRAVENOUS at 12:45

## 2024-06-21 RX ADMIN — FENTANYL CITRATE 25 MCG: 50 INJECTION INTRAMUSCULAR; INTRAVENOUS at 13:02

## 2024-06-21 RX ADMIN — PROMETHAZINE HYDROCHLORIDE 12.5 MG: 25 INJECTION INTRAMUSCULAR; INTRAVENOUS at 15:36

## 2024-06-21 RX ADMIN — ROCURONIUM BROMIDE 20 MG: 10 INJECTION, SOLUTION INTRAVENOUS at 13:21

## 2024-06-21 RX ADMIN — MIDAZOLAM 2 MG: 1 INJECTION INTRAMUSCULAR; INTRAVENOUS at 09:32

## 2024-06-21 RX ADMIN — FENTANYL CITRATE 100 MCG: 50 INJECTION INTRAMUSCULAR; INTRAVENOUS at 09:35

## 2024-06-21 RX ADMIN — FENTANYL CITRATE 50 MCG: 50 INJECTION INTRAMUSCULAR; INTRAVENOUS at 11:50

## 2024-06-21 RX ADMIN — GLYCOPYRROLATE 0.2 MCG: 0.2 INJECTION, SOLUTION INTRAMUSCULAR; INTRAVENOUS at 09:40

## 2024-06-21 RX ADMIN — ONDANSETRON 4 MG: 2 INJECTION INTRAMUSCULAR; INTRAVENOUS at 09:48

## 2024-06-21 RX ADMIN — FENTANYL CITRATE 25 MCG: 50 INJECTION INTRAMUSCULAR; INTRAVENOUS at 12:04

## 2024-06-21 RX ADMIN — Medication 100 MCG: at 14:10

## 2024-06-21 RX ADMIN — SODIUM CHLORIDE 125 ML/HR: 0.9 INJECTION, SOLUTION INTRAVENOUS at 07:50

## 2024-06-21 RX ADMIN — PROPOFOL 200 MG: 10 INJECTION, EMULSION INTRAVENOUS at 09:35

## 2024-06-21 RX ADMIN — DEXAMETHASONE SODIUM PHOSPHATE 10 MG: 10 INJECTION INTRAMUSCULAR; INTRAVENOUS at 09:40

## 2024-06-21 RX ADMIN — ROCURONIUM BROMIDE 20 MG: 10 INJECTION, SOLUTION INTRAVENOUS at 11:46

## 2024-06-21 NOTE — OP NOTE
OPERATIVE REPORT  PATIENT NAME: Daysi Bonilla    :  1985  MRN: [id number]  Pt Location: [facility] OR ROOM 01    SURGERY DATE: 2024    Surgeons and Role:     * Mele Bravo MD - Primary    Preop Diagnosis:  Hypertrophy of breast [N62]  Other back pain, unspecified chronicity [M54.89]    Post-Op Diagnosis Codes:     * Hypertrophy of breast [N62]     * Other back pain, unspecified chronicity [M54.89]    Procedure(s):  Bilateral - b/l breast reduction    Specimen(s):  ID Type Source Tests Collected by Time Destination   1 : left breast tissue Tissue Breast, Left TISSUE EXAM Mele Bravo MD 2024 1303    2 : right breast tissue Tissue Breast, Right TISSUE EXAM Mele Bravo MD 2024 1303        Estimated Blood Loss:   Minimal    Drains:  Closed/Suction Drain Inferior;Lateral;Right Breast Bulb 15 Fr. (Active)   Number of days: 0       Closed/Suction Drain Inferior;Lateral;Left Breast Bulb 15 Fr. (Active)   Number of days: 0       Urethral Catheter Latex 16 Fr. (Active)   Number of days: 0       Anesthesia Type:   General    Operative Indications:  Hypertrophy of breast [N62]  Other back pain, unspecified chronicity [M54.89]  This is a 39 year old female with symptomatic macromastia. She now presents for bilateral breast reduction. Risks, benefits and alternatives were discussed with the patient.    Operative Findings:  Right breast tissue 1042g  Left breast tissue 1212g      Complications:   None    Procedure and Technique:  The patient was met in the preoperative holding area and marked according to hospital policy. A Ga pattern breast reduction was planned and the patient was marked accordingly. The patient was brought to the operating room and positioned carefully on the table, all pressure points were adequately padded. Sequential compression devices (SCD) were placed on the legs for mechanical thromboembolic prophylaxis against DVT. IV antibiotics were  administered. Anesthesia was induced and the surgical site was prepped and draped in the usual sterile fashion. An OR pause was performed to confirm the correct patient, procedure, and fire safety risk. Local anesthesia consisting of 1% lidocaine with epinephrine (1:100,000) with 0.25% marcaine was injected into the planned incisions for post-op pain relief.     The breast reduction was planned with an inferior pedicle Wise pattern technique. The incisions were made with a scalpel. A tourniquet was applied to the base of the breast.  A 42mm cookie cutter was also used to score the nipple areolar complex.  The intervening skin was de-epithelialized sharply with a 10 blade to create a inferiorly-based pedicle of perfusion to the NAC. The tourniquet was released. The pedicle was carefully dissected with bovie to preserve blood supply to tissues including NAC.  The redundant breast skin, fat, and glandular tissue was resected and sent to pathology. The open wound of breast was copiously irrigated and inspected meticulously for hemostasis, which was achieved with Bovie electrocautery. A drain was placed. The Ga pattern inverted-T incision was then tailor tacked closed over the pedicle. The nipple areolar complex was then delivered and inset into the proper position along the breast meridian.  The vertical and horizontal incisions were then closed with 3-0 PDS sutures in a deep dermal fashion. The vertical incision was closed with 3-0 monocryl suture, and the horizontal incision was closed with 3-0 Stratifix suture.  The nipple areolar complex was also closed carefully and circumferentially utilizing 3-0 monocryl and 4-0 monocryl. The breast skin and the nipple-areolar complex appeared pink and viable. An identical procedure was performed on the other side. There was excellent symmetry noted between the two breasts at the end of the operation. The final specimen weights were: right side 1042g and left side 1212g.   Skin  glue was applied to the incisions.    All sponge, needle and instrument counts were correct at the end of the case. The patient tolerated the procedure without complications and was transferred to the recovery room in stable condition. I was the attending plastic surgeon for the entire procedure.    I was present for the entire procedure.    Patient Disposition:  PACU         SIGNATURE: Mele Bravo MD  DATE: June 21, 2024  TIME: 3:15 PM

## 2024-06-21 NOTE — INTERVAL H&P NOTE
H&P reviewed. After examining the patient I find no changes in the patients condition since the H&P had been written.    Vitals:    06/21/24 0750   BP: 124/76   Pulse: 87   Resp: 16   Temp: (!) 97 °F (36.1 °C)   SpO2: 96%     To OR for bilateral breast reduction

## 2024-06-21 NOTE — DISCHARGE INSTR - AVS FIRST PAGE
Keep incisions clean. OK to shower starting tomorrow, do not let the water hit the area directly. Wear the bra at all times after surgery until your follow up appointment, except to take a shower. No heavy lifting, limit weight to 10 lbs. Do not raise your arms more higher than your shoulder height. Empty and record drain output twice daily, keep a log of this output and bring it to your follow up appointment.    Take Tylenol and ibuprofen as needed for pain. If needed, you may take Percocet, just ensure that total Tylenol dose does not exceeed 4,000mg in a day.    Follow up in the office on 6/24/2024.    If you have any questions or concerns, please call the office.

## 2024-06-21 NOTE — ANESTHESIA POSTPROCEDURE EVALUATION
Post-Op Assessment Note    CV Status:  Stable  Pain Score: 0    Pain management: adequate       Mental Status:  Alert and awake   Hydration Status:  Euvolemic   PONV status: zofran given.   Airway Patency:  Patent     Post Op Vitals Reviewed: Yes    No anethesia notable event occurred.    Staff: CRNA               BP   128/80   Temp   97   Pulse  110   Resp   14   SpO2   92% RA

## 2024-06-21 NOTE — ANESTHESIA PREPROCEDURE EVALUATION
Procedure:  b/l breast reduction (Bilateral: Breast)    Relevant Problems   NEURO/PSYCH   (+) Generalized anxiety disorder        Physical Exam    Airway    Mallampati score: II         Dental       Cardiovascular  Rhythm: regular, Rate: normal    Pulmonary   Breath sounds clear to auscultation    Other Findings  post-pubertal.      Anesthesia Plan  ASA Score- 2     Anesthesia Type- general with ASA Monitors.         Additional Monitors:     Airway Plan:            Plan Factors-Exercise tolerance (METS): >4 METS.    Chart reviewed.   Existing labs reviewed. Patient summary reviewed.    Patient is not a current smoker.      Obstructive sleep apnea risk education given perioperatively.        Induction- intravenous.    Postoperative Plan-     Perioperative Resuscitation Plan - Level 1 - Full Code.       Informed Consent- Anesthetic plan and risks discussed with patient.

## 2024-06-26 PROCEDURE — 88305 TISSUE EXAM BY PATHOLOGIST: CPT | Performed by: PATHOLOGY

## 2024-09-03 ENCOUNTER — OFFICE VISIT (OUTPATIENT)
Dept: FAMILY MEDICINE CLINIC | Facility: CLINIC | Age: 39
End: 2024-09-03
Payer: COMMERCIAL

## 2024-09-03 VITALS
TEMPERATURE: 97.3 F | HEART RATE: 68 BPM | BODY MASS INDEX: 38.93 KG/M2 | HEIGHT: 64 IN | WEIGHT: 228 LBS | DIASTOLIC BLOOD PRESSURE: 72 MMHG | SYSTOLIC BLOOD PRESSURE: 116 MMHG | OXYGEN SATURATION: 99 %

## 2024-09-03 DIAGNOSIS — F41.1 GENERALIZED ANXIETY DISORDER: ICD-10-CM

## 2024-09-03 DIAGNOSIS — J01.00 ACUTE NON-RECURRENT MAXILLARY SINUSITIS: Primary | ICD-10-CM

## 2024-09-03 DIAGNOSIS — E66.09 CLASS 2 OBESITY DUE TO EXCESS CALORIES WITHOUT SERIOUS COMORBIDITY WITH BODY MASS INDEX (BMI) OF 39.0 TO 39.9 IN ADULT: ICD-10-CM

## 2024-09-03 PROCEDURE — 99214 OFFICE O/P EST MOD 30 MIN: CPT | Performed by: PHYSICIAN ASSISTANT

## 2024-09-03 PROCEDURE — 3725F SCREEN DEPRESSION PERFORMED: CPT | Performed by: PHYSICIAN ASSISTANT

## 2024-09-03 RX ORDER — CEFUROXIME AXETIL 500 MG/1
500 TABLET ORAL EVERY 12 HOURS SCHEDULED
Qty: 20 TABLET | Refills: 0 | Status: SHIPPED | OUTPATIENT
Start: 2024-09-03 | End: 2024-09-13

## 2024-09-03 NOTE — PATIENT INSTRUCTIONS
Assessment/plan:  1.  Acute sinusitis-patient started on Ceftin 500 mg twice daily for 10 days.  Encourage plenty of fluids and rest.  May continue Mucinex, Advil, Flonase over-the-counter as necessary.  Follow-up in the next 7 to 10 days if not improving.  2.  Generalized anxiety disorder-stable on Lexapro therapy, patient is following with [facility] for medication management.  3.  Obesity-patient is doing better and feeling better after breast reduction surgery this summer.  Continue healthy diet and exercise plan.

## 2024-09-03 NOTE — PROGRESS NOTES
Answers submitted by the patient for this visit:  Sore Throat Questionnaire (Submitted on 9/3/2024)  Chief Complaint: Sore throat  Chronicity: new  Onset: 1 to 4 weeks ago  Progression since onset: waxing and waning  Pain worse on: right  Fever: no fever  pain severity now: moderate  Pain - numeric: 4/10  abdominal pain: No  congestion: Yes  cough: No  diarrhea: No  drooling: No  ear discharge: No  ear pain: Yes  headaches: Yes  hoarse voice: No  neck pain: No  plugged ear sensation: No  stridor: No  shortness of breath: No  swollen glands: No  trouble swallowing: Yes  vomiting: No  Ambulatory Visit  Name: Daysi Bonilla      : 1985      MRN: [id number]  Encounter Provider: Matthew Worthy PA-C  Encounter Date: 9/3/2024   Encounter department: [facility]    Assessment & Plan   1. Acute non-recurrent maxillary sinusitis  -     cefuroxime (CEFTIN) 500 mg tablet; Take 1 tablet (500 mg total) by mouth every 12 (twelve) hours for 10 days  2. Generalized anxiety disorder  3. Class 2 obesity due to excess calories without serious comorbidity with body mass index (BMI) of 39.0 to 39.9 in adult      Depression Screening and Follow-up Plan: Patient was screened for depression during today's encounter. They screened negative with a PHQ-2 score of 0.      History of Present Illness     HPI: This is a 39-year-old female who presents to the office with sinus congestion and pressure for about 3 weeks.  She has had thicker yellow mucus and discharge with it.  She is having some headaches and discomfort.  Symptoms seem to come and go and she is also having some ear pain and pain into the throat more recently.  She has not had relief with Mucinex, Advil, or over-the-counter Flonase thus far.  She also has history of anxiety which has been stable on Lexapro.  She is continuing to follow with the specialist [facility].  She also mentions that she did have breast reduction surgery completed  "this summer and has been feeling better.  She is having less neck and shoulder pain and finds that she has had an easier time breathing since the surgery.    Sore Throat   This is a new problem. The current episode started 1 to 4 weeks ago. The problem has been waxing and waning. The pain is worse on the right side. There has been no fever. The pain is at a severity of 4/10. The pain is moderate. Associated symptoms include congestion, ear pain, headaches and trouble swallowing. Pertinent negatives include no abdominal pain, coughing, diarrhea, drooling, ear discharge, hoarse voice, plugged ear sensation, neck pain, shortness of breath, stridor, swollen glands or vomiting.       Review of Systems   HENT:  Positive for congestion, ear pain, sore throat and trouble swallowing. Negative for drooling, ear discharge and hoarse voice.    Respiratory:  Negative for cough, shortness of breath and stridor.    Gastrointestinal:  Negative for abdominal pain, diarrhea and vomiting.   Musculoskeletal:  Negative for neck pain.   Neurological:  Positive for headaches.       Objective     /72 (BP Location: Left arm, Patient Position: Sitting, Cuff Size: Large)   Pulse 68   Temp (!) 97.3 °F (36.3 °C) (Tympanic)   Ht 5' 4\" (1.626 m)   Wt 103 kg (228 lb)   SpO2 99%   BMI 39.14 kg/m²     Physical Exam  Vitals and nursing note reviewed.   Constitutional:       General: She is not in acute distress.     Appearance: She is well-developed.   HENT:      Head: Normocephalic and atraumatic.      Mouth/Throat:      Pharynx: No oropharyngeal exudate.   Eyes:      General:         Right eye: No discharge.         Left eye: No discharge.      Pupils: Pupils are equal, round, and reactive to light.   Cardiovascular:      Rate and Rhythm: Normal rate and regular rhythm.      Heart sounds: Normal heart sounds. No murmur heard.     No friction rub.   Pulmonary:      Effort: Pulmonary effort is normal. No respiratory distress.      Breath " sounds: Normal breath sounds. No wheezing or rales.   Musculoskeletal:         General: Normal range of motion.      Cervical back: Neck supple.   Lymphadenopathy:      Cervical: Cervical adenopathy present.   Skin:     General: Skin is warm and dry.      Findings: No erythema.   Neurological:      Mental Status: She is alert and oriented to person, place, and time.   Psychiatric:         Behavior: Behavior normal.       Administrative Statements

## 2025-05-02 ENCOUNTER — OFFICE VISIT (OUTPATIENT)
Dept: FAMILY MEDICINE CLINIC | Facility: CLINIC | Age: 40
End: 2025-05-02
Payer: COMMERCIAL

## 2025-05-02 ENCOUNTER — TELEPHONE (OUTPATIENT)
Dept: FAMILY MEDICINE CLINIC | Facility: CLINIC | Age: 40
End: 2025-05-02

## 2025-05-02 VITALS
WEIGHT: 229 LBS | DIASTOLIC BLOOD PRESSURE: 62 MMHG | HEIGHT: 64 IN | OXYGEN SATURATION: 99 % | BODY MASS INDEX: 39.09 KG/M2 | SYSTOLIC BLOOD PRESSURE: 114 MMHG | HEART RATE: 85 BPM

## 2025-05-02 DIAGNOSIS — F41.1 GENERALIZED ANXIETY DISORDER: ICD-10-CM

## 2025-05-02 DIAGNOSIS — E66.01 SEVERE OBESITY (BMI 35.0-39.9) WITH COMORBIDITY (HCC): ICD-10-CM

## 2025-05-02 DIAGNOSIS — F32.0 MILD MAJOR DEPRESSION (HCC): ICD-10-CM

## 2025-05-02 DIAGNOSIS — E66.01 SEVERE OBESITY (BMI 35.0-39.9) WITH COMORBIDITY (HCC): Primary | ICD-10-CM

## 2025-05-02 DIAGNOSIS — E55.9 VITAMIN D DEFICIENCY: ICD-10-CM

## 2025-05-02 DIAGNOSIS — K21.9 GASTROESOPHAGEAL REFLUX DISEASE WITHOUT ESOPHAGITIS: ICD-10-CM

## 2025-05-02 DIAGNOSIS — E78.5 DYSLIPIDEMIA: ICD-10-CM

## 2025-05-02 PROCEDURE — 99214 OFFICE O/P EST MOD 30 MIN: CPT | Performed by: PHYSICIAN ASSISTANT

## 2025-05-02 RX ORDER — SEMAGLUTIDE 0.25 MG/.5ML
0.25 INJECTION, SOLUTION SUBCUTANEOUS WEEKLY
Refills: 0 | OUTPATIENT
Start: 2025-05-02

## 2025-05-02 RX ORDER — SEMAGLUTIDE 0.25 MG/.5ML
INJECTION, SOLUTION SUBCUTANEOUS
Qty: 2 ML | Refills: 0 | Status: SHIPPED | OUTPATIENT
Start: 2025-05-02

## 2025-05-02 NOTE — ASSESSMENT & PLAN NOTE
Orders:  •  CBC and differential  •  Comprehensive metabolic panel  •  Lipid Panel with Direct LDL reflex  •  TSH, 3rd generation with Free T4 reflex  •  Hemoglobin A1C

## 2025-05-02 NOTE — PROGRESS NOTES
Name: Daysi Bonilla      : 1985      MRN: [id number]  Encounter Provider: Matthew Worthy PA-C  Encounter Date: 2025   Encounter department: [facility]  :  Assessment & Plan  Severe obesity (BMI 35.0-39.9) with comorbidity (HCC)  Prior Authorization Clinical Questions for Weight Management Pharmacotherapy    1. Does the patient have a contrainidcation to medication prescribed for weight management?: No  2. Does the patient have a diagnosis of obesity, confirmed by a BMI greater than or equal to 30 kg/m^2?: Yes  3. Does the patient have a BMI of greater than or equal to 27 kg/m^2 with at least one weight-related comorbidity/risk factor/complication (e.g. diabetes, dyslipidemia, coronary artery disease)?: Yes  4. Weight-related co-morbidities/risk factors: dyslipidemia, GERD, depression  5. WEGOVY CVA Indication: Does patient have established documented cardiovascular disease (history of a prior heart attack (myocardial infarction), stroke, or symptomatic peripheral arterial disease (PAD)?: N/A  6. ZEPBOUND GUADALUPE Indication: Does patient have documented GUADALUPE diagnosed via sleep study (insurance will require copy of sleep study results for approval)?: N/A  7. Has the patient been on a weight loss regimen of low-calorie diet, increased physical activity, and lifestyle modifications for a minimum of 6 months?: Yes  8. Has the patient completed a comprehensive weight loss program (ie, Weight Watchers, Noom, Bariatrics, other deep on phone)? If so, what?: No  9. Does the patient have a history of type 2 diabetes?: No  10. Has the member tried and failed other weight loss medication within the past 12 months?: No  11. Will the member use requested medication in combination with another GLP agonist or weight loss drug?: No  12. Is the medication a controlled substance?: No     Baseline weight (in pounds): 229 lbs         Orders:  •  CBC and differential  •  Comprehensive metabolic panel  •   Lipid Panel with Direct LDL reflex  •  TSH, 3rd generation with Free T4 reflex  •  Hemoglobin A1C  •  Semaglutide-Weight Management (Wegovy) 0.25 MG/0.5ML; Inject 0.25 mg under the skin weekly    Gastroesophageal reflux disease without esophagitis    Orders:  •  CBC and differential  •  Comprehensive metabolic panel  •  Lipid Panel with Direct LDL reflex  •  TSH, 3rd generation with Free T4 reflex  •  Hemoglobin A1C    Mild major depression (HCC)      Orders:  •  CBC and differential  •  Comprehensive metabolic panel  •  Lipid Panel with Direct LDL reflex  •  TSH, 3rd generation with Free T4 reflex  •  Hemoglobin A1C    Dyslipidemia    Orders:  •  CBC and differential  •  Comprehensive metabolic panel  •  Lipid Panel with Direct LDL reflex  •  TSH, 3rd generation with Free T4 reflex  •  Hemoglobin A1C    Vitamin D deficiency    Orders:  •  CBC and differential  •  Comprehensive metabolic panel  •  Lipid Panel with Direct LDL reflex  •  TSH, 3rd generation with Free T4 reflex  •  Hemoglobin A1C  •  Vitamin D 25 hydroxy    Generalized anxiety disorder    Orders:  •  CBC and differential  •  Comprehensive metabolic panel  •  Lipid Panel with Direct LDL reflex  •  TSH, 3rd generation with Free T4 reflex  •  Hemoglobin A1C           History of Present Illness   HPI: This is a 39-year-old female who presents to the office for follow-up of chronic health conditions and to discuss weight loss.  She has been frustrated with her inability to lose weight with diet and exercise.  She would like to get some labs and make sure there is no metabolic abnormalities.  She is also interested in discussing weight loss assisting medications.  Patient did have breast reduction surgery last year because of ongoing back pain and shoulder pain related to weight and breast size.  She has had some improvement in symptoms since then.  She also has comorbidities including esophageal reflux disease, depression, and dyslipidemia.  She is  "currently taking Lexapro for anxiety and depression and has been stable with this.  She has been finding herself using Tums more frequently with acid reflux and feels weight loss is necessary to help reduce this.      Review of Systems   Constitutional:  Negative for chills, fatigue and fever.   HENT:  Negative for congestion, ear pain and sinus pressure.    Eyes:  Negative for visual disturbance.   Respiratory:  Negative for cough, chest tightness and shortness of breath.    Cardiovascular:  Negative for chest pain and palpitations.   Gastrointestinal:  Negative for diarrhea, nausea and vomiting.   Endocrine: Negative for polyuria.   Genitourinary:  Negative for dysuria and frequency.   Musculoskeletal:  Negative for arthralgias and myalgias.   Skin:  Negative for pallor and rash.   Neurological:  Negative for dizziness, weakness, light-headedness, numbness and headaches.   Psychiatric/Behavioral:  Negative for agitation, behavioral problems and sleep disturbance.    All other systems reviewed and are negative.      Objective   /62 (BP Location: Right arm, Patient Position: Sitting, Cuff Size: Standard)   Pulse 85   Ht 5' 4\" (1.626 m)   Wt 104 kg (229 lb)   SpO2 99%   BMI 39.31 kg/m²      Physical Exam  Constitutional:       General: She is not in acute distress.     Appearance: Normal appearance.   HENT:      Head: Normocephalic and atraumatic.      Right Ear: Tympanic membrane normal.      Left Ear: Tympanic membrane normal.      Nose: No congestion or rhinorrhea.   Eyes:      Conjunctiva/sclera: Conjunctivae normal.      Pupils: Pupils are equal, round, and reactive to light.   Neck:      Vascular: No carotid bruit.   Cardiovascular:      Rate and Rhythm: Normal rate and regular rhythm.      Heart sounds: No murmur heard.  Pulmonary:      Effort: Pulmonary effort is normal. No respiratory distress.      Breath sounds: Normal breath sounds.   Abdominal:      Palpations: Abdomen is soft. "   Musculoskeletal:         General: Normal range of motion.      Cervical back: Normal range of motion and neck supple. No muscular tenderness.   Lymphadenopathy:      Cervical: No cervical adenopathy.   Skin:     General: Skin is warm.      Capillary Refill: Capillary refill takes less than 2 seconds.   Neurological:      General: No focal deficit present.      Mental Status: She is alert and oriented to person, place, and time.   Psychiatric:         Mood and Affect: Mood normal.

## 2025-05-05 NOTE — TELEPHONE ENCOUNTER
PA for Wegovy 0.25MG/0.5ML SUBMITTED to [employer]    via    []CMM-KEY:   [x]Surescripts-Case ID # [id number]   []Availity-Auth ID # NDC #   []Faxed to plan   []Other website   []Phone call Case ID #     [x]PA sent as URGENT    All office notes, labs and other pertaining documents and studies sent. Clinical questions answered. Awaiting determination from insurance company.     Turnaround time for your insurance to make a decision on your Prior Authorization can take 7-21 business days.

## 2025-05-05 NOTE — TELEPHONE ENCOUNTER
PA for Wegovy 0.25MG/0.5ML EXCLUDED from plan       Reason        Message sent to office clinical pool Yes

## 2025-05-10 LAB
25(OH)D3 SERPL-MCNC: 45 NG/ML (ref 30–100)
ALBUMIN SERPL-MCNC: 4.2 G/DL (ref 3.6–5.1)
ALBUMIN/GLOB SERPL: 2 (CALC) (ref 1–2.5)
ALP SERPL-CCNC: 73 U/L (ref 31–125)
ALT SERPL-CCNC: 12 U/L (ref 6–29)
AST SERPL-CCNC: 14 U/L (ref 10–30)
BASOPHILS # BLD AUTO: 11 CELLS/UL (ref 0–200)
BASOPHILS NFR BLD AUTO: 0.3 %
BILIRUB SERPL-MCNC: 0.3 MG/DL (ref 0.2–1.2)
BUN SERPL-MCNC: 13 MG/DL (ref 7–25)
BUN/CREAT SERPL: NORMAL (CALC) (ref 6–22)
CALCIUM SERPL-MCNC: 8.9 MG/DL (ref 8.6–10.2)
CHLORIDE SERPL-SCNC: 107 MMOL/L (ref 98–110)
CHOLEST SERPL-MCNC: 193 MG/DL
CHOLEST/HDLC SERPL: 6.4 (CALC)
CO2 SERPL-SCNC: 27 MMOL/L (ref 20–32)
CREAT SERPL-MCNC: 0.65 MG/DL (ref 0.5–0.99)
EOSINOPHIL # BLD AUTO: 59 CELLS/UL (ref 15–500)
EOSINOPHIL NFR BLD AUTO: 1.6 %
ERYTHROCYTE [DISTWIDTH] IN BLOOD BY AUTOMATED COUNT: 12.1 % (ref 11–15)
GFR/BSA.PRED SERPLBLD CYS-BASED-ARV: 113 ML/MIN/1.73M2
GLOBULIN SER CALC-MCNC: 2.1 G/DL (CALC) (ref 1.9–3.7)
GLUCOSE SERPL-MCNC: 91 MG/DL (ref 65–99)
HBA1C MFR BLD: 5.6 %
HCT VFR BLD AUTO: 38.5 % (ref 35–45)
HDLC SERPL-MCNC: 30 MG/DL
HGB BLD-MCNC: 12.9 G/DL (ref 11.7–15.5)
LDLC SERPL CALC-MCNC: 142 MG/DL (CALC)
LYMPHOCYTES # BLD AUTO: 1288 CELLS/UL (ref 850–3900)
LYMPHOCYTES NFR BLD AUTO: 34.8 %
MCH RBC QN AUTO: 30.3 PG (ref 27–33)
MCHC RBC AUTO-ENTMCNC: 33.5 G/DL (ref 32–36)
MCV RBC AUTO: 90.4 FL (ref 80–100)
MONOCYTES # BLD AUTO: 278 CELLS/UL (ref 200–950)
MONOCYTES NFR BLD AUTO: 7.5 %
NEUTROPHILS # BLD AUTO: 2065 CELLS/UL (ref 1500–7800)
NEUTROPHILS NFR BLD AUTO: 55.8 %
NONHDLC SERPL-MCNC: 163 MG/DL (CALC)
PLATELET # BLD AUTO: 206 THOUSAND/UL (ref 140–400)
PMV BLD REES-ECKER: 9.9 FL (ref 7.5–12.5)
POTASSIUM SERPL-SCNC: 4.5 MMOL/L (ref 3.5–5.3)
PROT SERPL-MCNC: 6.3 G/DL (ref 6.1–8.1)
RBC # BLD AUTO: 4.26 MILLION/UL (ref 3.8–5.1)
SODIUM SERPL-SCNC: 140 MMOL/L (ref 135–146)
TRIGL SERPL-MCNC: 101 MG/DL
TSH SERPL-ACNC: 1.93 MIU/L
WBC # BLD AUTO: 3.7 THOUSAND/UL (ref 3.8–10.8)

## 2025-05-12 ENCOUNTER — RESULTS FOLLOW-UP (OUTPATIENT)
Dept: FAMILY MEDICINE CLINIC | Facility: CLINIC | Age: 40
End: 2025-05-12

## 2025-05-14 ENCOUNTER — TELEPHONE (OUTPATIENT)
Dept: FAMILY MEDICINE CLINIC | Facility: CLINIC | Age: 40
End: 2025-05-14

## 2025-05-14 DIAGNOSIS — E66.01 SEVERE OBESITY (BMI 35.0-39.9) WITH COMORBIDITY (HCC): Primary | ICD-10-CM

## 2025-08-08 ENCOUNTER — OFFICE VISIT (OUTPATIENT)
Dept: BARIATRICS | Facility: CLINIC | Age: 40
End: 2025-08-08
Payer: COMMERCIAL

## 2025-08-08 ENCOUNTER — TELEPHONE (OUTPATIENT)
Dept: BARIATRICS | Facility: CLINIC | Age: 40
End: 2025-08-08

## 2025-08-08 VITALS
HEART RATE: 68 BPM | BODY MASS INDEX: 37.99 KG/M2 | RESPIRATION RATE: 16 BRPM | HEIGHT: 65 IN | WEIGHT: 228 LBS | DIASTOLIC BLOOD PRESSURE: 70 MMHG | TEMPERATURE: 96.2 F | SYSTOLIC BLOOD PRESSURE: 114 MMHG

## 2025-08-08 DIAGNOSIS — E66.01 SEVERE OBESITY (BMI 35.0-39.9) WITH COMORBIDITY (HCC): ICD-10-CM

## 2025-08-08 DIAGNOSIS — E66.812 CLASS 2 OBESITY DUE TO EXCESS CALORIES WITH BODY MASS INDEX (BMI) OF 38.0 TO 38.9 IN ADULT, UNSPECIFIED WHETHER SERIOUS COMORBIDITY PRESENT: Primary | ICD-10-CM

## 2025-08-08 DIAGNOSIS — F32.A ANXIETY AND DEPRESSION: ICD-10-CM

## 2025-08-08 DIAGNOSIS — E78.5 HYPERLIPIDEMIA: ICD-10-CM

## 2025-08-08 DIAGNOSIS — F41.9 ANXIETY AND DEPRESSION: ICD-10-CM

## 2025-08-08 DIAGNOSIS — E66.09 CLASS 2 OBESITY DUE TO EXCESS CALORIES WITH BODY MASS INDEX (BMI) OF 38.0 TO 38.9 IN ADULT, UNSPECIFIED WHETHER SERIOUS COMORBIDITY PRESENT: Primary | ICD-10-CM

## 2025-08-08 DIAGNOSIS — E66.812 CLASS 2 OBESITY DUE TO EXCESS CALORIES WITH BODY MASS INDEX (BMI) OF 38.0 TO 38.9 IN ADULT, UNSPECIFIED WHETHER SERIOUS COMORBIDITY PRESENT: ICD-10-CM

## 2025-08-08 DIAGNOSIS — E66.09 CLASS 2 OBESITY DUE TO EXCESS CALORIES WITH BODY MASS INDEX (BMI) OF 38.0 TO 38.9 IN ADULT, UNSPECIFIED WHETHER SERIOUS COMORBIDITY PRESENT: ICD-10-CM

## 2025-08-08 PROCEDURE — 99203 OFFICE O/P NEW LOW 30 MIN: CPT | Performed by: STUDENT IN AN ORGANIZED HEALTH CARE EDUCATION/TRAINING PROGRAM

## 2025-08-08 RX ORDER — TIRZEPATIDE 2.5 MG/.5ML
2.5 INJECTION, SOLUTION SUBCUTANEOUS WEEKLY
Qty: 2 ML | Refills: 0 | Status: SHIPPED | OUTPATIENT
Start: 2025-08-08 | End: 2025-09-05

## 2025-08-08 RX ORDER — TIRZEPATIDE 2.5 MG/.5ML
2.5 INJECTION, SOLUTION SUBCUTANEOUS WEEKLY
Refills: 0 | OUTPATIENT
Start: 2025-08-08 | End: 2025-09-05

## 2025-08-18 ENCOUNTER — TELEPHONE (OUTPATIENT)
Dept: BARIATRICS | Facility: CLINIC | Age: 40
End: 2025-08-18

## (undated) DEVICE — CHLORAPREP HI-LITE 26ML ORANGE

## (undated) DEVICE — LIGHT GLOVE GREEN

## (undated) DEVICE — BASIC SINGLE BASIN-LF: Brand: MEDLINE INDUSTRIES, INC.

## (undated) DEVICE — STERILE POLYISOPRENE POWDER-FREE SURGICAL GLOVES WITH EMOLLIENT COATING: Brand: PROTEXIS

## (undated) DEVICE — DISPOSABLE OR TOWEL: Brand: CARDINAL HEALTH

## (undated) DEVICE — INTENDED FOR TISSUE SEPARATION, AND OTHER PROCEDURES THAT REQUIRE A SHARP SURGICAL BLADE TO PUNCTURE OR CUT.: Brand: BARD-PARKER ® SAFETYLOCK CARBON RIB-BACK BLADES

## (undated) DEVICE — TUBING SUCTION 5MM X 12 FT

## (undated) DEVICE — NEPTUNE E-SEP SMOKE EVACUATION PENCIL, COATED, 70MM BLADE, PUSH BUTTON SWITCH: Brand: NEPTUNE E-SEP

## (undated) DEVICE — NEEDLE 25G X 1 1/2

## (undated) DEVICE — SYRINGE 30ML LL

## (undated) DEVICE — ELECTRODE BLADE MOD E-Z CLEAN 2.5IN 6.4CM -0012M

## (undated) DEVICE — MEDI-VAC YANK SUCT HNDL W/TPRD BULBOUS TIP: Brand: CARDINAL HEALTH

## (undated) DEVICE — 1820 FOAM BLOCK NEEDLE COUNTER: Brand: DEVON

## (undated) DEVICE — 3M™ TEGADERM™ CHG DRESSING 25/CARTON 4 CARTONS/CASE 1659: Brand: TEGADERM™

## (undated) DEVICE — SKIN MARKER DUAL TIP WITH RULER CAP, FLEXIBLE RULER AND LABELS: Brand: DEVON

## (undated) DEVICE — STANDARD SURGICAL GOWN, L: Brand: CONVERTORS

## (undated) DEVICE — SPONGE LAP 18 X 18 IN STRL RFD

## (undated) DEVICE — STERILE POLYISOPRENE POWDER-FREE SURGICAL GLOVES: Brand: PROTEXIS

## (undated) DEVICE — SCD SEQUENTIAL COMPRESSION COMFORT SLEEVE MEDIUM KNEE LENGTH: Brand: KENDALL SCD

## (undated) DEVICE — PACK UNIVERSAL DRAPES SUB-Q ICD

## (undated) DEVICE — NEEDLE BLUNT 18 G X 1 1/2IN

## (undated) DEVICE — BULB SYRINGE,IRRIGATION WITH PROTECTIVE CAP: Brand: DOVER

## (undated) DEVICE — SYRINGE 10ML LL

## (undated) DEVICE — JP CHAN DRN SIL HUBLESS 15FR W/TRO: Brand: CARDINAL HEALTH

## (undated) DEVICE — DRAPE SHEET THREE QUARTER

## (undated) DEVICE — 3M™ IOBAN™ 2 ANTIMICROBIAL INCISE DRAPE 6650EZ: Brand: IOBAN™ 2

## (undated) DEVICE — DRESSING BIOPATCH ANTIMICROBIOL 3/4 IN DISC

## (undated) DEVICE — EXOFIN PRECISION PEN HIGH VISCOSITY TOPICAL SKIN ADHESIVE: Brand: EXOFIN PRECISION PEN, 1G